# Patient Record
Sex: FEMALE | Race: WHITE | Employment: OTHER | URBAN - METROPOLITAN AREA
[De-identification: names, ages, dates, MRNs, and addresses within clinical notes are randomized per-mention and may not be internally consistent; named-entity substitution may affect disease eponyms.]

---

## 2017-01-03 DIAGNOSIS — I10 HYPERTENSION, ESSENTIAL, BENIGN: Primary | ICD-10-CM

## 2017-01-03 RX ORDER — VERAPAMIL HYDROCHLORIDE 120 MG/1
TABLET, FILM COATED ORAL
Qty: 270 TABLET | Refills: 0 | Status: SHIPPED | OUTPATIENT
Start: 2017-01-03 | End: 2017-07-04

## 2017-01-05 DIAGNOSIS — I10 HYPERTENSION, ESSENTIAL, BENIGN: Primary | ICD-10-CM

## 2017-01-05 DIAGNOSIS — E89.0 POSTSURGICAL HYPOTHYROIDISM: ICD-10-CM

## 2017-01-05 RX ORDER — IRBESARTAN 300 MG/1
TABLET ORAL
Qty: 90 TABLET | Refills: 1 | OUTPATIENT
Start: 2017-01-05

## 2017-01-05 RX ORDER — LEVOTHYROXINE SODIUM 137 UG/1
TABLET ORAL
Qty: 90 TABLET | Refills: 0 | Status: SHIPPED | OUTPATIENT
Start: 2017-01-05 | End: 2017-02-27 | Stop reason: DRUGHIGH

## 2017-01-05 NOTE — TELEPHONE ENCOUNTER
Patient called and requested Levothyroxine and Irbesartan 90 day day to be sent to [facility] on file.

## 2017-02-21 ENCOUNTER — TELEPHONE (OUTPATIENT)
Dept: INTERNAL MEDICINE CLINIC | Facility: CLINIC | Age: 77
End: 2017-02-21

## 2017-02-21 NOTE — TELEPHONE ENCOUNTER
Spoke with patient and she c/o bilateral leg pain on and off x1 month that has gotten worse this week. She states she is taking something for pain everyday, otherwise she can not sleep due to pain. She denies pain at calf area, no SOB or chest pain.  She de

## 2017-02-22 ENCOUNTER — OFFICE VISIT (OUTPATIENT)
Dept: INTERNAL MEDICINE CLINIC | Facility: CLINIC | Age: 77
End: 2017-02-22

## 2017-02-22 VITALS
OXYGEN SATURATION: 97 % | TEMPERATURE: 98 F | WEIGHT: 135 LBS | BODY MASS INDEX: 26.5 KG/M2 | DIASTOLIC BLOOD PRESSURE: 76 MMHG | RESPIRATION RATE: 14 BRPM | SYSTOLIC BLOOD PRESSURE: 138 MMHG | HEIGHT: 60 IN | HEART RATE: 82 BPM

## 2017-02-22 DIAGNOSIS — M48.061 LUMBAR STENOSIS: Primary | ICD-10-CM

## 2017-02-22 DIAGNOSIS — M79.605 BILATERAL LEG PAIN: ICD-10-CM

## 2017-02-22 DIAGNOSIS — M79.604 BILATERAL LEG PAIN: ICD-10-CM

## 2017-02-22 PROCEDURE — 99214 OFFICE O/P EST MOD 30 MIN: CPT | Performed by: PHYSICIAN ASSISTANT

## 2017-02-22 RX ORDER — BUPROPION HYDROCHLORIDE 150 MG/1
TABLET ORAL
Refills: 1 | COMMUNITY
Start: 2017-01-03 | End: 2017-06-22 | Stop reason: DRUGHIGH

## 2017-02-22 RX ORDER — PANTOPRAZOLE SODIUM 20 MG/1
TABLET, DELAYED RELEASE ORAL
Refills: 0 | COMMUNITY
Start: 2017-01-03 | End: 2017-02-24 | Stop reason: DRUGHIGH

## 2017-02-22 NOTE — PROGRESS NOTES
HPI:    Patient ID: Tito Russell is a 67year old female. Leg Pain   The pain is present in the left hip, left upper leg, right upper leg and right hip (sometimes radiates down to feet). This is a new problem.  The current episode started 1 to 4 we DAILY Disp: 90 tablet Rfl: 0   Insulin Pen Needle (BD PEN NEEDLE MINI U/F) 31G X 5 MM Does not apply Misc Use 4 times daily Disp: 400 each Rfl: 3   SF 1.1 % Dental Gel  Disp:  Rfl: 5   RaNITidine HCl 300 MG Oral Tab Take 300 mg by mouth nightly.  Disp:  Rfl [DISCONTINUED] IRBESARTAN 300 MG Oral Tab TAKE 1 TABLET (300 MG) BY MOUTH ONCE DAILY Disp: 90 tablet Rfl: 0     Allergies:  Abilify                 Unknown    Comment:tremor  Bextra [Valdecoxib]     Nausea only    Comment:\"TABS\"  Clindamycin 26.37 kg/m2  SpO2 97%          ASSESSMENT/PLAN:   Lumbar stenosis  (primary encounter diagnosis)  Bilateral leg pain    Leg pain appears radicular, recommend physical therapy. Pt expresses understanding and agrees to the plan.  Return for appointment as s

## 2017-02-24 ENCOUNTER — OFFICE VISIT (OUTPATIENT)
Dept: INTERNAL MEDICINE CLINIC | Facility: CLINIC | Age: 77
End: 2017-02-24

## 2017-02-24 VITALS
SYSTOLIC BLOOD PRESSURE: 110 MMHG | HEIGHT: 60.25 IN | TEMPERATURE: 98 F | BODY MASS INDEX: 25.96 KG/M2 | RESPIRATION RATE: 16 BRPM | HEART RATE: 87 BPM | OXYGEN SATURATION: 97 % | WEIGHT: 134 LBS | DIASTOLIC BLOOD PRESSURE: 70 MMHG

## 2017-02-24 DIAGNOSIS — Z12.31 VISIT FOR SCREENING MAMMOGRAM: ICD-10-CM

## 2017-02-24 DIAGNOSIS — Z00.00 ENCOUNTER FOR ANNUAL HEALTH EXAMINATION: Primary | ICD-10-CM

## 2017-02-24 DIAGNOSIS — R20.2 PARESTHESIA: ICD-10-CM

## 2017-02-24 PROCEDURE — G0439 PPPS, SUBSEQ VISIT: HCPCS | Performed by: PHYSICIAN ASSISTANT

## 2017-02-24 NOTE — PROGRESS NOTES
HPI:   Rigo Pablo is a 67year old female who presents for a Medicare Subsequent Annual Wellness visit (Pt already had Initial Annual Wellness). Patient doing well.  Has been losing weight with Weight Wat[employer] and with switching to [place] has had TSH 0.125* 02/25/2017   CREATSERUM 0.72 02/25/2017   * 02/25/2017        CBC  (most recent labs)     Lab Results  Component Value Date   WBC 5.2 02/25/2017   HGB 13.3 02/25/2017   .0 02/25/2017        ALLERGIES:   She is allergic to abilify Cramping or Diarrhea. Beclomethasone Dipropionate (QNASL) 80 MCG/ACT Nasal Aero Soln 2 sprays by Nasal route daily.  (Patient taking differently: 2 sprays by Nasal route daily as needed.)   Insulin Pen Needle (BD PEN NEEDLE ALAN U/F) 32G X 4 MM Does not a and cataract. Her family history includes Cancer in her son; Diabetes in her brother, father, paternal grandfather, and sister; Heart Disorder in her mother; Hypertension in her son; Thyroid Disorder in an other family member.    SOCIAL HISTORY:   She  r external ear canals, both ears   Nose: Nares normal, septum midline,mucosa normal, no drainage or sinus tenderness   Throat: Lips, mucosa, and tongue normal; teeth and gums normal   Neck: Supple, symmetrical, trachea midline, no adenopathy;  thyroid: not e Advanced Directive:  Living Will on file in [address]? Rusty Benoit does not have a Living Will on file in [address]  Discussed with patient and provided information      Healthcare Power of Jose on file in [facility]:    Rusty Benoit does not have a Varsha 1-Yes    Do you have difficulty seeing?: 0-No    Do you have any difficulty walking or getting up?: 0-No    Do you have any tripping hazards?: 0-No    Are you on multiple medications?: 1-Yes    Does pain affect your day to day activities?: 1-Yes     Have y Colonoscopy Screen every 10 years Colonoscopy,10 Years due on 06/03/2023 Update Health Maintenance if applicable    Flex Sigmoidoscopy Screen every 10 years No results found for this or any previous visit. No flowsheet data found.      Fecal Occult Blood An Procedure   Annual Monitoring of Persistent     Medications (ACE/ARB, digoxin diuretics, anticonvulsants.)    Potassium  Annually POTASSIUM (mmol/L)   Date Value   02/25/2017 3.9   05/02/2014 4.0   09/26/2012 4.4   11/08/2010 4.3    No flowsheet data found

## 2017-02-24 NOTE — PATIENT INSTRUCTIONS
Check on due date for screening mammogram with [facility]  Have labs drawn, fasting, for both sets of orders provided (CBC, CMP, TSH, LIPID PANEL, VITAMIN D, MICROALBUMIN, UA as well as B12, FOLATE)    Callie Marcus's SCREENING SCHEDULE   Tests on this list a screening covered service except at the Welcome to Medicare Visit    Abdominal aortic aneurysm screening (once between ages 73-68) IPPE only No results found for this or any previous visit.  Limited to patients who meet one of the following criteria:   • Me results found for: CHLAMYDIA No flowsheet data found.     Screening Mammogram      Mammogram    Recommend Annually to at least age 76, and as needed after 76 Mammogram,1 Yr due on 10/23/2016 Please get this Mammogram regularly   Immunizations      Influenza necessary form from the [facility]. http://www. idph.[facility]. [place]/public/books/advin.htm  A link to the [employer].  This site has a lot of good information including definitions of the different types of Ad

## 2017-02-25 ENCOUNTER — LAB ENCOUNTER (OUTPATIENT)
Dept: LAB | Age: 77
End: 2017-02-25
Attending: PHYSICIAN ASSISTANT
Payer: MEDICARE

## 2017-02-25 DIAGNOSIS — E11.40 TYPE 2 DIABETES, CONTROLLED, WITH NEUROPATHY (HCC): ICD-10-CM

## 2017-02-25 DIAGNOSIS — E89.0 POSTSURGICAL HYPOTHYROIDISM: ICD-10-CM

## 2017-02-25 DIAGNOSIS — M85.80 OSTEOPENIA: ICD-10-CM

## 2017-02-25 DIAGNOSIS — R20.2 PARESTHESIA: ICD-10-CM

## 2017-02-25 LAB
25-HYDROXYVITAMIN D (TOTAL): 67.1 NG/ML (ref 30–100)
ALBUMIN SERPL-MCNC: 3.6 G/DL (ref 3.5–4.8)
ALP LIVER SERPL-CCNC: 86 U/L (ref 55–142)
ALT SERPL-CCNC: 25 U/L (ref 14–54)
AST SERPL-CCNC: 24 U/L (ref 15–41)
BASOPHILS # BLD AUTO: 0.04 X10(3) UL (ref 0–0.1)
BASOPHILS NFR BLD AUTO: 0.8 %
BILIRUB SERPL-MCNC: 0.6 MG/DL (ref 0.1–2)
BILIRUB UR QL STRIP.AUTO: NEGATIVE
BUN BLD-MCNC: 17 MG/DL (ref 8–20)
CALCIUM BLD-MCNC: 8.5 MG/DL (ref 8.3–10.3)
CHLORIDE: 107 MMOL/L (ref 101–111)
CHOLEST SMN-MCNC: 120 MG/DL (ref ?–200)
CLARITY UR REFRACT.AUTO: CLEAR
CO2: 29 MMOL/L (ref 22–32)
COLOR UR AUTO: YELLOW
CREAT BLD-MCNC: 0.72 MG/DL (ref 0.55–1.02)
CREAT UR-SCNC: 129 MG/DL
EOSINOPHIL # BLD AUTO: 0.14 X10(3) UL (ref 0–0.3)
EOSINOPHIL NFR BLD AUTO: 2.7 %
ERYTHROCYTE [DISTWIDTH] IN BLOOD BY AUTOMATED COUNT: 12.7 % (ref 11.5–16)
EST. AVERAGE GLUCOSE BLD GHB EST-MCNC: 137 MG/DL (ref 68–126)
FOLATE (FOLIC ACID), SERUM: 13.3 NG/ML (ref 8.7–24)
FREE T4: 1.1 NG/DL (ref 0.9–1.8)
GLUCOSE BLD-MCNC: 130 MG/DL (ref 70–99)
GLUCOSE UR STRIP.AUTO-MCNC: NEGATIVE MG/DL
HAV AB SERPL IA-ACNC: 306 PG/ML (ref 193–986)
HBA1C MFR BLD HPLC: 6.4 % (ref ?–5.7)
HCT VFR BLD AUTO: 40.1 % (ref 34–50)
HDLC SERPL-MCNC: 54 MG/DL (ref 45–?)
HDLC SERPL: 2.22 {RATIO} (ref ?–4.44)
HGB BLD-MCNC: 13.3 G/DL (ref 12–16)
IMMATURE GRANULOCYTE COUNT: 0.01 X10(3) UL (ref 0–1)
IMMATURE GRANULOCYTE RATIO %: 0.2 %
KETONES UR STRIP.AUTO-MCNC: NEGATIVE MG/DL
LDLC SERPL CALC-MCNC: 50 MG/DL (ref ?–130)
LYMPHOCYTES # BLD AUTO: 1.29 X10(3) UL (ref 0.9–4)
LYMPHOCYTES NFR BLD AUTO: 24.8 %
M PROTEIN MFR SERPL ELPH: 6.4 G/DL (ref 6.1–8.3)
MCH RBC QN AUTO: 31.1 PG (ref 27–33.2)
MCHC RBC AUTO-ENTMCNC: 33.2 G/DL (ref 31–37)
MCV RBC AUTO: 93.9 FL (ref 81–100)
MICROALBUMIN UR-MCNC: 1.18 MG/DL
MICROALBUMIN/CREAT 24H UR-RTO: 9.1 UG/MG (ref ?–30)
MONOCYTES # BLD AUTO: 0.39 X10(3) UL (ref 0.1–0.6)
MONOCYTES NFR BLD AUTO: 7.5 %
NEUTROPHIL ABS PRELIM: 3.33 X10 (3) UL (ref 1.3–6.7)
NEUTROPHILS # BLD AUTO: 3.33 X10(3) UL (ref 1.3–6.7)
NEUTROPHILS NFR BLD AUTO: 64 %
NITRITE UR QL STRIP.AUTO: NEGATIVE
NONHDLC SERPL-MCNC: 66 MG/DL (ref ?–130)
PH UR STRIP.AUTO: 5 [PH] (ref 4.5–8)
PLATELET # BLD AUTO: 200 10(3)UL (ref 150–450)
POTASSIUM SERPL-SCNC: 3.9 MMOL/L (ref 3.6–5.1)
PROT UR STRIP.AUTO-MCNC: NEGATIVE MG/DL
RBC # BLD AUTO: 4.27 X10(6)UL (ref 3.8–5.1)
RBC UR QL AUTO: NEGATIVE
RED CELL DISTRIBUTION WIDTH-SD: 43.4 FL (ref 35.1–46.3)
SODIUM SERPL-SCNC: 142 MMOL/L (ref 136–144)
SP GR UR STRIP.AUTO: 1.02 (ref 1–1.03)
TRIGLYCERIDES: 78 MG/DL (ref ?–150)
TSI SER-ACNC: 0.12 MIU/ML (ref 0.35–5.5)
UROBILINOGEN UR STRIP.AUTO-MCNC: <2 MG/DL
VLDL: 16 MG/DL (ref 5–40)
WBC # BLD AUTO: 5.2 X10(3) UL (ref 4–13)

## 2017-02-25 PROCEDURE — 82570 ASSAY OF URINE CREATININE: CPT

## 2017-02-25 PROCEDURE — 82306 VITAMIN D 25 HYDROXY: CPT

## 2017-02-25 PROCEDURE — 84443 ASSAY THYROID STIM HORMONE: CPT

## 2017-02-25 PROCEDURE — 80061 LIPID PANEL: CPT

## 2017-02-25 PROCEDURE — 84439 ASSAY OF FREE THYROXINE: CPT

## 2017-02-25 PROCEDURE — 82746 ASSAY OF FOLIC ACID SERUM: CPT

## 2017-02-25 PROCEDURE — 80053 COMPREHEN METABOLIC PANEL: CPT

## 2017-02-25 PROCEDURE — 87088 URINE BACTERIA CULTURE: CPT

## 2017-02-25 PROCEDURE — 82043 UR ALBUMIN QUANTITATIVE: CPT

## 2017-02-25 PROCEDURE — 85025 COMPLETE CBC W/AUTO DIFF WBC: CPT

## 2017-02-25 PROCEDURE — 36415 COLL VENOUS BLD VENIPUNCTURE: CPT

## 2017-02-25 PROCEDURE — 87186 SC STD MICRODIL/AGAR DIL: CPT

## 2017-02-25 PROCEDURE — 81001 URINALYSIS AUTO W/SCOPE: CPT

## 2017-02-25 PROCEDURE — 83036 HEMOGLOBIN GLYCOSYLATED A1C: CPT

## 2017-02-25 PROCEDURE — 87086 URINE CULTURE/COLONY COUNT: CPT

## 2017-02-25 PROCEDURE — 82607 VITAMIN B-12: CPT

## 2017-02-27 ENCOUNTER — TELEPHONE (OUTPATIENT)
Dept: INTERNAL MEDICINE CLINIC | Facility: CLINIC | Age: 77
End: 2017-02-27

## 2017-02-27 ENCOUNTER — MED REC SCAN ONLY (OUTPATIENT)
Dept: INTERNAL MEDICINE CLINIC | Facility: CLINIC | Age: 77
End: 2017-02-27

## 2017-02-27 DIAGNOSIS — E89.0 POSTSURGICAL HYPOTHYROIDISM: Primary | ICD-10-CM

## 2017-02-27 RX ORDER — LEVOTHYROXINE SODIUM 0.12 MG/1
125 TABLET ORAL
Qty: 30 TABLET | Refills: 1 | Status: SHIPPED | OUTPATIENT
Start: 2017-02-27 | End: 2017-04-03

## 2017-02-27 NOTE — TELEPHONE ENCOUNTER
----- Message from Karla Mendoza PA-C sent at 2/26/2017  4:41 PM CST -----  Please inform pt: levothyroxine dose too high, decrease to 125 mcg and recheck in 6 wks.   Otherwise cholesterol and vitamin D at goal, B12 and folate wnl, no concerning findin

## 2017-02-27 NOTE — TELEPHONE ENCOUNTER
----- Message from David Su MD sent at 2/27/2017 12:20 AM CST -----  HbA1C has improved from 6.7 in 12/2016. Please, inform the pt.

## 2017-02-27 NOTE — TELEPHONE ENCOUNTER
----- Message from Sotero Masters PA-C sent at 2/27/2017 12:38 PM CST -----  Please inform pt: urine cx + for e.coli, does pt have UTI symptoms?

## 2017-03-02 NOTE — PROGRESS NOTES
Quick Note:    Appreciate that results were forwarded to me.     We will discuss them in detail at her upcoming appt in April 4/7/2017 11:00 AM MD Shirin Haney        ______

## 2017-03-06 ENCOUNTER — OFFICE VISIT (OUTPATIENT)
Dept: RHEUMATOLOGY | Facility: CLINIC | Age: 77
End: 2017-03-06

## 2017-03-06 VITALS
RESPIRATION RATE: 12 BRPM | HEART RATE: 68 BPM | WEIGHT: 134 LBS | DIASTOLIC BLOOD PRESSURE: 86 MMHG | BODY MASS INDEX: 26 KG/M2 | SYSTOLIC BLOOD PRESSURE: 128 MMHG

## 2017-03-06 DIAGNOSIS — M79.7 FIBROMYALGIA: Primary | Chronic | ICD-10-CM

## 2017-03-06 DIAGNOSIS — M47.816 SPONDYLOSIS OF LUMBAR REGION WITHOUT MYELOPATHY OR RADICULOPATHY: ICD-10-CM

## 2017-03-06 PROCEDURE — 99213 OFFICE O/P EST LOW 20 MIN: CPT | Performed by: INTERNAL MEDICINE

## 2017-03-06 RX ORDER — HYDROCODONE BITARTRATE AND ACETAMINOPHEN 10; 325 MG/1; MG/1
1 TABLET ORAL EVERY 6 HOURS PRN
Qty: 120 TABLET | Refills: 0 | Status: SHIPPED | OUTPATIENT
Start: 2017-03-06 | End: 2017-03-06

## 2017-03-06 RX ORDER — HYDROCODONE BITARTRATE AND ACETAMINOPHEN 10; 325 MG/1; MG/1
1 TABLET ORAL EVERY 6 HOURS PRN
Qty: 120 TABLET | Refills: 0 | Status: SHIPPED | OUTPATIENT
Start: 2017-05-06 | End: 2017-03-30

## 2017-03-06 NOTE — PATIENT INSTRUCTIONS
At this time the plan is to use the Norco 10 mg 1 tablet every 6 hours for pain generally 1 or 2 a day if needed. It is okay to take 2 a day it will not become a habit at that point. It would be better to take 5 or 6 a day.   Main side effect of Norco is

## 2017-03-06 NOTE — PROGRESS NOTES
[facility]  Dr. Erica Redd Progress Note     Subjective:   Viv Warner is a(n) 67year old female. Current complaints: Patient presents with:  Fibromyalgia Syndrome: Pt states 'is feeling good with the exception of legs- achy.  Has been doing PT bu

## 2017-03-30 ENCOUNTER — TELEPHONE (OUTPATIENT)
Dept: RHEUMATOLOGY | Facility: CLINIC | Age: 77
End: 2017-03-30

## 2017-03-30 RX ORDER — HYDROCODONE BITARTRATE AND ACETAMINOPHEN 10; 325 MG/1; MG/1
1 TABLET ORAL EVERY 6 HOURS PRN
Qty: 120 TABLET | Refills: 0 | Status: SHIPPED | OUTPATIENT
Start: 2017-04-10 | End: 2017-04-03

## 2017-03-30 NOTE — TELEPHONE ENCOUNTER
Pt called because she want's her norco script to be dated to 4-10-17 instead of 4-17-17. Pt is going out of the country on 4-17-17 and will return on 5-28-17. Pt's last refill on 3-6-17. Please advised.

## 2017-04-03 DIAGNOSIS — E89.0 POSTSURGICAL HYPOTHYROIDISM: Primary | ICD-10-CM

## 2017-04-03 RX ORDER — HYDROCODONE BITARTRATE AND ACETAMINOPHEN 10; 325 MG/1; MG/1
1 TABLET ORAL EVERY 6 HOURS PRN
Qty: 120 TABLET | Refills: 0 | Status: SHIPPED | OUTPATIENT
Start: 2017-04-10 | End: 2017-08-28

## 2017-04-03 RX ORDER — LEVOTHYROXINE SODIUM 0.12 MG/1
125 TABLET ORAL
Qty: 90 TABLET | Refills: 0 | Status: SHIPPED | OUTPATIENT
Start: 2017-04-03 | End: 2017-04-10 | Stop reason: DRUGHIGH

## 2017-04-03 NOTE — TELEPHONE ENCOUNTER
Pt requesting 90 day supply of Synthroid, sent to [address] [place] [address] on file. Pt will be going out of town for 6 wks.

## 2017-04-10 ENCOUNTER — APPOINTMENT (OUTPATIENT)
Dept: LAB | Age: 77
End: 2017-04-10
Attending: PHYSICIAN ASSISTANT
Payer: MEDICARE

## 2017-04-10 ENCOUNTER — TELEPHONE (OUTPATIENT)
Dept: INTERNAL MEDICINE CLINIC | Facility: CLINIC | Age: 77
End: 2017-04-10

## 2017-04-10 DIAGNOSIS — E89.0 POSTSURGICAL HYPOTHYROIDISM: ICD-10-CM

## 2017-04-10 DIAGNOSIS — E89.0 POSTSURGICAL HYPOTHYROIDISM: Primary | ICD-10-CM

## 2017-04-10 PROCEDURE — 36415 COLL VENOUS BLD VENIPUNCTURE: CPT

## 2017-04-10 PROCEDURE — 84443 ASSAY THYROID STIM HORMONE: CPT

## 2017-04-10 RX ORDER — LEVOTHYROXINE SODIUM 112 UG/1
112 TABLET ORAL
Qty: 60 TABLET | Refills: 0 | Status: SHIPPED | OUTPATIENT
Start: 2017-04-10 | End: 2017-06-21 | Stop reason: DRUGHIGH

## 2017-04-10 RX ORDER — ATORVASTATIN CALCIUM 40 MG/1
TABLET, FILM COATED ORAL
Qty: 90 TABLET | Refills: 0 | Status: SHIPPED | OUTPATIENT
Start: 2017-04-10 | End: 2017-06-06

## 2017-04-10 RX ORDER — METOPROLOL SUCCINATE 25 MG/1
TABLET, EXTENDED RELEASE ORAL
Qty: 90 TABLET | Refills: 0 | Status: SHIPPED | OUTPATIENT
Start: 2017-04-10 | End: 2017-06-06

## 2017-04-10 NOTE — TELEPHONE ENCOUNTER
D/w pt results and recommendations. Pt states she will be on a trip for 6 weeks and would like #60 of new dose to hold her over until her return. Pt will then recheck her TSH when she returns. Rx and order placed.

## 2017-04-10 NOTE — TELEPHONE ENCOUNTER
----- Message from Judge Kaylyn PA-C sent at 4/10/2017  1:00 PM CDT -----  Please inform pt: TSH improved but still out of range.  Decrease levothyroxine to 112 mcg daily, recheck 6 wks

## 2017-04-12 ENCOUNTER — TELEPHONE (OUTPATIENT)
Dept: INTERNAL MEDICINE CLINIC | Facility: CLINIC | Age: 77
End: 2017-04-12

## 2017-04-12 NOTE — TELEPHONE ENCOUNTER
Please call patient  Patient is wondering if her atorvastatin had been increased to 40. What She had been taking is only 20.

## 2017-04-12 NOTE — TELEPHONE ENCOUNTER
Spoke with pt she has been having some neck pain and she spoke with her previous physical therapist regarding this. When she was receiving PT a special neck pad was used and this helped greatly. The name of the pad was occipivot.   The PT recommended pt c

## 2017-04-12 NOTE — TELEPHONE ENCOUNTER
Spoke with pt she no longer has a question she had both her medication and her spouse's medication and was confused but now she realized.

## 2017-04-12 NOTE — TELEPHONE ENCOUNTER
Pt needs to speak w/ a nurse about a neck pad that she is recommended to use, but she doesn't know if her insurance will cover it?

## 2017-06-02 ENCOUNTER — MED REC SCAN ONLY (OUTPATIENT)
Dept: INTERNAL MEDICINE CLINIC | Facility: CLINIC | Age: 77
End: 2017-06-02

## 2017-06-02 ENCOUNTER — TELEPHONE (OUTPATIENT)
Dept: INTERNAL MEDICINE CLINIC | Facility: CLINIC | Age: 77
End: 2017-06-02

## 2017-06-02 DIAGNOSIS — R42 VERTIGO: Primary | ICD-10-CM

## 2017-06-02 DIAGNOSIS — R26.81 UNSTEADY GAIT: ICD-10-CM

## 2017-06-02 NOTE — TELEPHONE ENCOUNTER
Pt has had vertigo for the past 3 weeks while on vacation, also having some balance issues, would like to have some physical therapy done, call back

## 2017-06-02 NOTE — TELEPHONE ENCOUNTER
Per Dr. Regalado Saliva refer to [address].      Order faxed to preferred [facility].    Pt aware.

## 2017-06-06 DIAGNOSIS — E78.5 HYPERLIPIDEMIA LDL GOAL <100: Primary | ICD-10-CM

## 2017-06-06 DIAGNOSIS — I10 HYPERTENSION, ESSENTIAL, BENIGN: ICD-10-CM

## 2017-06-06 RX ORDER — ATORVASTATIN CALCIUM 40 MG/1
TABLET, FILM COATED ORAL
Qty: 90 TABLET | Refills: 1 | Status: SHIPPED | OUTPATIENT
Start: 2017-06-06 | End: 2018-01-05

## 2017-06-06 RX ORDER — METOPROLOL SUCCINATE 25 MG/1
TABLET, EXTENDED RELEASE ORAL
Qty: 90 TABLET | Refills: 1 | Status: SHIPPED | OUTPATIENT
Start: 2017-06-06 | End: 2018-01-05

## 2017-06-06 RX ORDER — IRBESARTAN 300 MG/1
TABLET ORAL
Qty: 90 TABLET | Refills: 1 | Status: SHIPPED | OUTPATIENT
Start: 2017-06-06 | End: 2018-01-05

## 2017-06-20 ENCOUNTER — APPOINTMENT (OUTPATIENT)
Dept: LAB | Age: 77
End: 2017-06-20
Attending: PHYSICIAN ASSISTANT
Payer: MEDICARE

## 2017-06-20 DIAGNOSIS — E89.0 POSTSURGICAL HYPOTHYROIDISM: ICD-10-CM

## 2017-06-20 PROCEDURE — 84443 ASSAY THYROID STIM HORMONE: CPT

## 2017-06-20 PROCEDURE — 36415 COLL VENOUS BLD VENIPUNCTURE: CPT

## 2017-06-21 ENCOUNTER — TELEPHONE (OUTPATIENT)
Dept: INTERNAL MEDICINE CLINIC | Facility: CLINIC | Age: 77
End: 2017-06-21

## 2017-06-21 DIAGNOSIS — E89.0 POSTSURGICAL HYPOTHYROIDISM: Primary | ICD-10-CM

## 2017-06-21 RX ORDER — LEVOTHYROXINE SODIUM 0.1 MG/1
100 TABLET ORAL DAILY
Qty: 30 TABLET | Refills: 1 | Status: SHIPPED | OUTPATIENT
Start: 2017-06-21 | End: 2017-06-22 | Stop reason: ALTCHOICE

## 2017-06-21 NOTE — TELEPHONE ENCOUNTER
----- Message from Antwan Soni PA-C sent at 6/20/2017 10:10 PM CDT -----  Please inform pt: synthroid dose still too high.  Decrease synthroid to 100 mcg recheck TSH in 6 wks

## 2017-07-04 DIAGNOSIS — I10 HYPERTENSION, ESSENTIAL, BENIGN: ICD-10-CM

## 2017-07-05 RX ORDER — VERAPAMIL HYDROCHLORIDE 120 MG/1
TABLET, FILM COATED ORAL
Qty: 270 TABLET | Refills: 1 | Status: SHIPPED | OUTPATIENT
Start: 2017-07-05 | End: 2017-12-27

## 2017-08-03 ENCOUNTER — TELEPHONE (OUTPATIENT)
Dept: INTERNAL MEDICINE CLINIC | Facility: CLINIC | Age: 77
End: 2017-08-03

## 2017-08-03 NOTE — TELEPHONE ENCOUNTER
Spoke with patient and she states she is unhappy with her psychiatrist. She has a difficult time getting a hold of her and she is often given the wrong prescriptions. Patient states she has lost \" all trust\" with her care.      Per Dr. Dede Campo, he is okay wi

## 2017-08-03 NOTE — TELEPHONE ENCOUNTER
Pt had been getting prescriptions for bupropion 300mg and prozac 20mg from her psychiatrist, but she says she is not able to go to that psych anymore - wants to know if Dr Sabine Yung could prescribe these meds for her? Please c/b at [phone].

## 2017-08-08 ENCOUNTER — TELEPHONE (OUTPATIENT)
Dept: INTERNAL MEDICINE CLINIC | Facility: CLINIC | Age: 77
End: 2017-08-08

## 2017-08-08 ENCOUNTER — OFFICE VISIT (OUTPATIENT)
Dept: INTERNAL MEDICINE CLINIC | Facility: CLINIC | Age: 77
End: 2017-08-08

## 2017-08-08 ENCOUNTER — APPOINTMENT (OUTPATIENT)
Dept: LAB | Age: 77
End: 2017-08-08
Attending: PHYSICIAN ASSISTANT
Payer: MEDICARE

## 2017-08-08 VITALS
OXYGEN SATURATION: 93 % | DIASTOLIC BLOOD PRESSURE: 76 MMHG | HEIGHT: 60.25 IN | BODY MASS INDEX: 26.74 KG/M2 | WEIGHT: 138 LBS | HEART RATE: 75 BPM | RESPIRATION RATE: 16 BRPM | SYSTOLIC BLOOD PRESSURE: 122 MMHG | TEMPERATURE: 98 F

## 2017-08-08 DIAGNOSIS — E78.2 MIXED HYPERLIPIDEMIA: ICD-10-CM

## 2017-08-08 DIAGNOSIS — E11.40 TYPE 2 DIABETES, CONTROLLED, WITH NEUROPATHY (HCC): Primary | ICD-10-CM

## 2017-08-08 DIAGNOSIS — E89.0 POSTSURGICAL HYPOTHYROIDISM: ICD-10-CM

## 2017-08-08 DIAGNOSIS — I10 HYPERTENSION, ESSENTIAL, BENIGN: ICD-10-CM

## 2017-08-08 DIAGNOSIS — E89.0 POSTSURGICAL HYPOTHYROIDISM: Primary | ICD-10-CM

## 2017-08-08 DIAGNOSIS — F34.1 DYSTHYMIA: ICD-10-CM

## 2017-08-08 LAB — TSI SER-ACNC: 11.6 MIU/ML (ref 0.35–5.5)

## 2017-08-08 PROCEDURE — 36415 COLL VENOUS BLD VENIPUNCTURE: CPT

## 2017-08-08 PROCEDURE — 84443 ASSAY THYROID STIM HORMONE: CPT

## 2017-08-08 PROCEDURE — 99214 OFFICE O/P EST MOD 30 MIN: CPT | Performed by: INTERNAL MEDICINE

## 2017-08-08 RX ORDER — LEVOTHYROXINE SODIUM 0.1 MG/1
100 TABLET ORAL DAILY
COMMUNITY
Start: 2017-08-08 | End: 2017-08-11 | Stop reason: DRUGHIGH

## 2017-08-08 RX ORDER — LEVOTHYROXINE SODIUM 112 UG/1
TABLET ORAL
Qty: 30 TABLET | Refills: 1 | Status: CANCELLED | OUTPATIENT
Start: 2017-08-08

## 2017-08-08 RX ORDER — LEVOTHYROXINE SODIUM 0.1 MG/1
TABLET ORAL
Qty: 30 TABLET | Refills: 1 | Status: CANCELLED | OUTPATIENT
Start: 2017-08-08

## 2017-08-08 RX ORDER — BUPROPION HYDROCHLORIDE 300 MG/1
300 TABLET ORAL DAILY
Qty: 90 TABLET | Refills: 1 | Status: SHIPPED | OUTPATIENT
Start: 2017-08-08 | End: 2018-01-05

## 2017-08-08 RX ORDER — AMOXICILLIN 500 MG/1
CAPSULE ORAL
COMMUNITY
Start: 2017-08-07 | End: 2017-09-21 | Stop reason: ALTCHOICE

## 2017-08-08 RX ORDER — FLUOXETINE HYDROCHLORIDE 20 MG/1
20 CAPSULE ORAL DAILY
Qty: 90 CAPSULE | Refills: 1 | Status: SHIPPED | OUTPATIENT
Start: 2017-08-08 | End: 2018-02-19

## 2017-08-08 NOTE — PROGRESS NOTES
HPI:    Patient ID: Sobeida Beauchamp is a 67year old female. HPI  HPI:   Sobeida Beauchamp is a 67year old female who presents for recheck of her diabetes, htn, HLD and depression. Patient’s FBS have been fluctuating for last few weeks.  Has appt with minesh Disp: 90 tablet Rfl: 1   VERAPAMIL  MG Oral Tab TAKE 1 TABLET BY MOUTH 3 (THREE) TIMES DAILY Disp: 270 tablet Rfl: 1   BYETTA 10 MCG PEN 10 MCG/0.04ML Subcutaneous Solution Pen-injector TAKE 10 MCG BY SUBCUTANEOUS ROUTE BEFORE BREAKFAST AND DINNER D Génesis BRISCOE Does not apply Misc For insulin use 3 times per day Disp: 300 each Rfl: 1   MELATONIN 1 tablet nightly.  Disp:  Rfl:    VAGIFEM 10 MCG Vaginal Tab INSERT 1 TABLET (10 MCG) BY VAGINAL ROUTE TWICE WEEKLY (Patient taking differently: MANAGEMENT  8/17/2012: FLUOR GID & Delonte Pier NDL/CATH SPI DX/THER [facility]      Comment: Procedure: CERVICAL EPIDURAL;  Surgeon:                Heidi Saenz MD;  Location: [place] [address] MANAGEMENT  7/26/2012: INJECTION, W/WO CONTRAST, DX/TH auscultation  CARDIO: RRR without murmur  GI: good BS's,no masses, HSM or tenderness  EXTREMITIES: no cyanosis, clubbing or edema  NEURO: Bilateral barefoot skin diabetic exam is normal, visualized feet and the appearance is normal.  Bilateral monofilament Dx: Type 2 DM (E11.65) on insulin Disp: 300 strip Rfl: 1   Ipratropium Bromide 0.03 % Nasal Solution 2 sprays by Nasal route 3 (three) times daily.  As needed Disp: 1 Bottle Rfl: 11   HYDROcodone-acetaminophen  MG Oral Tab Take 1 tablet by mouth every Iodine              Swelling    Comment:SOB             REQUIRES PRETREATMENT FOR ANY CT/XRAY STUDIES             OK W/ BETADINE  Mri [Gadolinium Rj*    Tightness in Throat    Comment:PT STATES SHE THINKS SHE HAD REACTION TO CONTRAST             FOR AN MR

## 2017-08-08 NOTE — TELEPHONE ENCOUNTER
----- Message from Oliver Landry PA-C sent at 8/8/2017  1:02 PM CDT -----  Please inform pt: now synthroid dose too low. Continue 100 mcg M/W/F and start 112 mcg TU/TH/SA/SU. Recheck 6 wks.  Will consult with her endo if still not at goal.

## 2017-08-08 NOTE — TELEPHONE ENCOUNTER
Spoke with pt she sees her endocrinologist on Friday and will discuss further Levothyroxine dosage titration with her. She will continue taking current dose of 100 mcg daily.

## 2017-08-14 ENCOUNTER — LAB ENCOUNTER (OUTPATIENT)
Dept: LAB | Age: 77
End: 2017-08-14
Attending: INTERNAL MEDICINE
Payer: MEDICARE

## 2017-08-14 DIAGNOSIS — E11.40 TYPE 2 DIABETES, CONTROLLED, WITH NEUROPATHY (HCC): ICD-10-CM

## 2017-08-14 LAB — GLUCOSE BLD-MCNC: 233 MG/DL (ref 70–99)

## 2017-08-14 PROCEDURE — 84681 ASSAY OF C-PEPTIDE: CPT

## 2017-08-14 PROCEDURE — 36415 COLL VENOUS BLD VENIPUNCTURE: CPT

## 2017-08-14 PROCEDURE — 82947 ASSAY GLUCOSE BLOOD QUANT: CPT

## 2017-08-15 LAB — C-PEPTIDE, SERUM OR PLASMA: 1.1 NG/ML

## 2017-08-28 ENCOUNTER — TELEPHONE (OUTPATIENT)
Dept: RHEUMATOLOGY | Facility: CLINIC | Age: 77
End: 2017-08-28
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
## 2017-08-28 DIAGNOSIS — M79.7 FIBROMYALGIA: Primary | Chronic | ICD-10-CM

## 2017-08-28 RX ORDER — HYDROCODONE BITARTRATE AND ACETAMINOPHEN 10; 325 MG/1; MG/1
1 TABLET ORAL EVERY 6 HOURS PRN
Qty: 120 TABLET | Refills: 0 | Status: SHIPPED | OUTPATIENT
Start: 2017-08-28 | End: 2017-11-10 | Stop reason: ALTCHOICE

## 2017-08-28 NOTE — TELEPHONE ENCOUNTER
Pt called stating that she fx her elbow and bruised her ribs and is taking 4 norco a day and is out. No appts are available for Dr. Everette Olsen since pt is going out of town early Wed morning.  Pt is going to see Dr. Colleen Urbina to get her norco. Nurse OK'd it this on

## 2017-08-28 NOTE — TELEPHONE ENCOUNTER
Please call patient  Patient fall on Wednesday last week and saw a Ortho  On Friday  because she has broken her elbow and bruised her ribs. She has not been in to have a ER Fu. Patient is needing a script of Norco for the rib pain.   We did not prescrib

## 2017-08-28 NOTE — TELEPHONE ENCOUNTER
Ok per Dr. Regalado Saliva for Rx of Rousseau to be refilled. The pt is to FU with the orthopedic as directed. Refill will be ready for  at the . Pt verbalized understanding.

## 2017-08-28 NOTE — TELEPHONE ENCOUNTER
The pt states Norco was not given to the pt from the orthopedic due to the [facility] script that the pt has had for fibromyalgia. The pt has since realized that she does not have enough of the medication at home and will need another prescription.  The pt state

## 2017-09-02 PROBLEM — Z79.4 UNCONTROLLED TYPE 2 DIABETES MELLITUS WITH HYPERGLYCEMIA, WITH LONG-TERM CURRENT USE OF INSULIN (HCC): Status: ACTIVE | Noted: 2017-09-02

## 2017-09-02 PROBLEM — E11.65 UNCONTROLLED TYPE 2 DIABETES MELLITUS WITH HYPERGLYCEMIA, WITH LONG-TERM CURRENT USE OF INSULIN (HCC): Status: ACTIVE | Noted: 2017-09-02

## 2017-09-08 ENCOUNTER — TELEPHONE (OUTPATIENT)
Dept: INTERNAL MEDICINE CLINIC | Facility: CLINIC | Age: 77
End: 2017-09-08

## 2017-09-08 ENCOUNTER — HOSPITAL ENCOUNTER (OUTPATIENT)
Dept: GENERAL RADIOLOGY | Age: 77
Discharge: HOME | End: 2017-09-08
Attending: INTERNAL MEDICINE
Payer: MEDICARE

## 2017-09-08 ENCOUNTER — OFFICE VISIT (OUTPATIENT)
Dept: INTERNAL MEDICINE CLINIC | Facility: CLINIC | Age: 77
End: 2017-09-08

## 2017-09-08 VITALS
TEMPERATURE: 98 F | RESPIRATION RATE: 12 BRPM | BODY MASS INDEX: 27.52 KG/M2 | HEIGHT: 60.25 IN | HEART RATE: 68 BPM | WEIGHT: 142 LBS | SYSTOLIC BLOOD PRESSURE: 130 MMHG | DIASTOLIC BLOOD PRESSURE: 78 MMHG

## 2017-09-08 DIAGNOSIS — Z23 NEED FOR INFLUENZA VACCINATION: ICD-10-CM

## 2017-09-08 DIAGNOSIS — M54.50 SEVERE LUMBAR PAIN: ICD-10-CM

## 2017-09-08 DIAGNOSIS — Z91.041 CONTRAST MEDIA ALLERGY: ICD-10-CM

## 2017-09-08 DIAGNOSIS — M54.50 SEVERE LUMBAR PAIN: Primary | ICD-10-CM
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
## 2017-09-08 DIAGNOSIS — R42 VERTIGO: ICD-10-CM

## 2017-09-08 PROCEDURE — 90653 IIV ADJUVANT VACCINE IM: CPT | Performed by: INTERNAL MEDICINE

## 2017-09-08 PROCEDURE — 99214 OFFICE O/P EST MOD 30 MIN: CPT | Performed by: INTERNAL MEDICINE

## 2017-09-08 PROCEDURE — G0008 ADMIN INFLUENZA VIRUS VAC: HCPCS | Performed by: INTERNAL MEDICINE

## 2017-09-08 PROCEDURE — 72110 X-RAY EXAM L-2 SPINE 4/>VWS: CPT | Performed by: INTERNAL MEDICINE

## 2017-09-08 RX ORDER — PREDNISONE 50 MG/1
TABLET ORAL
Qty: 3 TABLET | Refills: 0 | Status: SHIPPED | OUTPATIENT
Start: 2017-09-08 | End: 2017-09-21 | Stop reason: ALTCHOICE

## 2017-09-08 NOTE — TELEPHONE ENCOUNTER
Per pt, she fell and broke her arm a few weeks ago (has already seen Ortho for this) but then about 4 days ago fell again on her tailbone.  She has some pain medication but wants to know if she should be seen to evaluate or if additional pain meds can be pr

## 2017-09-08 NOTE — PROGRESS NOTES
HPI:    Patient ID: Guzman Donis is a 67year old female. Dizziness   This is a new problem. The current episode started more than 1 month ago. The problem occurs 2 to 4 times per day. The problem has been gradually worsening.  Associated symptoms in tablet Rfl: 0   DiphenhydrAMINE HCl 50 MG Oral Tab Take one (1) 50mg tablet by mouth one (1) hour before the examination. Disp: 1 tablet Rfl: 0   HYDROcodone-acetaminophen  MG Oral Tab Take 1 tablet by mouth every 6 (six) hours as needed for Pain.  Barry Granda W/DEVICE Does not apply Kit Dispense 1 OneTouch UltraSmart Meter Disp: 1 kit Rfl: 0   Hyoscyamine Sulfate (LEVSIN/SL) 0.125 MG Sublingual SL Tab Take 125 mcg by mouth every 6 (six) hours as needed for Cramping or Diarrhea.  Disp:  Rfl:    Beclomethasone Dip Bowel sounds are normal. She exhibits no distension. There is no tenderness. Musculoskeletal:        Lumbar back: She exhibits tenderness (L3 level). Neurological: She has normal strength. No cranial nerve deficit. She displays a negative Romberg sign.

## 2017-09-11 DIAGNOSIS — F40.240 CLAUSTROPHOBIA: ICD-10-CM

## 2017-09-11 RX ORDER — DIAZEPAM 5 MG/1
TABLET ORAL
Qty: 2 TABLET | Refills: 0 | OUTPATIENT
Start: 2017-09-11 | End: 2017-09-14

## 2017-09-11 NOTE — TELEPHONE ENCOUNTER
Pt wants to know if we can prescribe an anxiety medication for her MRI appt on 9/13.  Please call her back at [phone]

## 2017-09-11 NOTE — TELEPHONE ENCOUNTER
Per Dr Arturo Cartagena Diazepam 5mg 30 minutes prior to MRI may repeat at time of MRI. D/w pt above she expressed understanding. Rx called in.

## 2017-09-12 ENCOUNTER — MED REC SCAN ONLY (OUTPATIENT)
Dept: INTERNAL MEDICINE CLINIC | Facility: CLINIC | Age: 77
End: 2017-09-12

## 2017-09-13 ENCOUNTER — HOSPITAL ENCOUNTER (OUTPATIENT)
Dept: MRI IMAGING | Age: 77
Discharge: HOME | End: 2017-09-13
Attending: INTERNAL MEDICINE
Payer: MEDICARE

## 2017-09-13 DIAGNOSIS — R42 VERTIGO: ICD-10-CM

## 2017-09-13 PROCEDURE — A9575 INJ GADOTERATE MEGLUMI 0.1ML: HCPCS

## 2017-09-13 PROCEDURE — 70553 MRI BRAIN STEM W/O & W/DYE: CPT | Performed by: INTERNAL MEDICINE

## 2017-09-14 ENCOUNTER — TELEPHONE (OUTPATIENT)
Dept: INTERNAL MEDICINE CLINIC | Facility: CLINIC | Age: 77
End: 2017-09-14

## 2017-09-14 DIAGNOSIS — F40.240 CLAUSTROPHOBIA: ICD-10-CM

## 2017-09-14 DIAGNOSIS — R42 DIZZINESS: Primary | ICD-10-CM

## 2017-09-14 DIAGNOSIS — Z91.041: ICD-10-CM

## 2017-09-14 RX ORDER — PREDNISONE 50 MG/1
TABLET ORAL
Qty: 3 TABLET | Refills: 0 | Status: CANCELLED | OUTPATIENT
Start: 2017-09-14

## 2017-09-14 RX ORDER — PREDNISONE 10 MG/1
10 TABLET ORAL DAILY
Qty: 5 TABLET | Refills: 0 | Status: SHIPPED | OUTPATIENT
Start: 2017-09-14 | End: 2017-09-21 | Stop reason: ALTCHOICE

## 2017-09-14 RX ORDER — DIAZEPAM 5 MG/1
TABLET ORAL
Qty: 2 TABLET | Refills: 0 | Status: SHIPPED
Start: 2017-09-14 | End: 2017-11-10 | Stop reason: ALTCHOICE

## 2017-09-14 NOTE — TELEPHONE ENCOUNTER
----- Message from Nayeli Woods MD sent at 9/14/2017  8:48 AM CDT -----  right frontal parietal region may represent bony exostosis or possibly a meningioma.   Order CT head examination with and without contrast

## 2017-09-14 NOTE — TELEPHONE ENCOUNTER
Spoke with patient and relayed test results. She verbalized understanding and agreed with plan. She is requesting a refill on the Valium to get there through the test.     Ok per Dr. Anais Clarke to refill.    All orders placed

## 2017-09-14 NOTE — TELEPHONE ENCOUNTER
Contrast allergy. Ok per Dr. Mikal Bosch to pre-treat for contrast allergy, still recommends CT w and w/out. Orders pending.

## 2017-09-15 ENCOUNTER — TELEPHONE (OUTPATIENT)
Dept: INTERNAL MEDICINE CLINIC | Facility: CLINIC | Age: 77
End: 2017-09-15

## 2017-09-15 NOTE — TELEPHONE ENCOUNTER
Pt would like radiologist to look at other MRI results from the past, would like to speak to a nurse about this

## 2017-09-15 NOTE — TELEPHONE ENCOUNTER
Spoke with patient and relayed MRI results again and Dr. Joseph Alcaraz recommendations. She verbalized understanding and stated she will discuss with her  and decide on completing CT scan.

## 2017-09-18 ENCOUNTER — HOSPITAL ENCOUNTER (OUTPATIENT)
Dept: CT IMAGING | Age: 77
Discharge: HOME | End: 2017-09-18
Attending: INTERNAL MEDICINE
Payer: MEDICARE

## 2017-09-18 ENCOUNTER — TELEPHONE (OUTPATIENT)
Dept: INTERNAL MEDICINE CLINIC | Facility: CLINIC | Age: 77
End: 2017-09-18

## 2017-09-18 DIAGNOSIS — R42 DIZZINESS: ICD-10-CM

## 2017-09-18 DIAGNOSIS — Z91.041: ICD-10-CM

## 2017-09-18 DIAGNOSIS — F40.240 CLAUSTROPHOBIA: ICD-10-CM

## 2017-09-18 PROCEDURE — 70470 CT HEAD/BRAIN W/O & W/DYE: CPT | Performed by: INTERNAL MEDICINE

## 2017-09-18 NOTE — TELEPHONE ENCOUNTER
Patient called and requested a call back from RN regarding her medication intake before her MRI. Please advise.

## 2017-09-18 NOTE — TELEPHONE ENCOUNTER
Pt states will be having a \"different kind of an MRI\" to measure a bone spur. Discussed in detail the pt is to take the following for contrast allergy protocol:     Prednisone 50mg 13, 7 and 1 hour prior to the procedure.  The pt is to also to take Be

## 2017-09-19 ENCOUNTER — TELEPHONE (OUTPATIENT)
Dept: INTERNAL MEDICINE CLINIC | Facility: CLINIC | Age: 77
End: 2017-09-19

## 2017-09-19 DIAGNOSIS — R42 DIZZINESS: Primary | ICD-10-CM

## 2017-09-19 NOTE — TELEPHONE ENCOUNTER
AZIZA ROQUE. D/w pt she sees a neurologist at [facility], [place] and will f/u with them as well as ENT.

## 2017-09-19 NOTE — TELEPHONE ENCOUNTER
----- Message from Ramsey Alvarez MD sent at 9/19/2017  8:59 AM CDT -----  Normal CT brain. No acute process. Refer to neurology for eval of dizziness.  Dr. Zuly Friend

## 2017-09-21 PROBLEM — H90.3 SENSORINEURAL HEARING LOSS (SNHL) OF BOTH EARS: Status: ACTIVE | Noted: 2017-09-21

## 2017-09-25 ENCOUNTER — TELEPHONE (OUTPATIENT)
Dept: INTERNAL MEDICINE CLINIC | Facility: CLINIC | Age: 77
End: 2017-09-25

## 2017-09-25 ENCOUNTER — TELEPHONE (OUTPATIENT)
Dept: RHEUMATOLOGY | Facility: CLINIC | Age: 77
End: 2017-09-25

## 2017-09-25 DIAGNOSIS — M47.816 SPONDYLOSIS OF LUMBAR REGION WITHOUT MYELOPATHY OR RADICULOPATHY: ICD-10-CM

## 2017-09-25 DIAGNOSIS — M79.7 FIBROMYALGIA: Primary | Chronic | ICD-10-CM

## 2017-09-25 NOTE — TELEPHONE ENCOUNTER
Pt left a message asking for a physical therapy order for water aerobics be sent to [facility], fax#[phone], ph#[phone]. Please call her once that has been sent.

## 2017-09-25 NOTE — TELEPHONE ENCOUNTER
Pt called stating she has fallen twice since last visit, broke arm, is being worked up by neurologist. Pt requesting precription for water aerobics/physical therapy. Informed pt Dr. Rigo Mueller is OOT, suggested she call her PCP for prescription.  Pt stated she

## 2017-09-25 NOTE — TELEPHONE ENCOUNTER
Spoke with patient and she states Dr. Ovidio Martinez had recommended she start water aerobic classes to help manage her low back pain and fibromyalgia. Patient states she can do this through [facility] but she needs an order.      I called Dr. Meet Stephenson office an

## 2017-09-26 ENCOUNTER — HOSPITAL ENCOUNTER (OUTPATIENT)
Dept: MAMMOGRAPHY | Age: 77
Discharge: HOME | End: 2017-09-26
Attending: PHYSICIAN ASSISTANT
Payer: MEDICARE

## 2017-09-26 DIAGNOSIS — Z12.31 VISIT FOR SCREENING MAMMOGRAM: ICD-10-CM

## 2017-09-26 PROCEDURE — 77067 SCR MAMMO BI INCL CAD: CPT | Performed by: PHYSICIAN ASSISTANT

## 2017-09-27 ENCOUNTER — TELEPHONE (OUTPATIENT)
Dept: INTERNAL MEDICINE CLINIC | Facility: CLINIC | Age: 77
End: 2017-09-27

## 2017-09-27 DIAGNOSIS — N64.89 BREAST ASYMMETRY: Primary | ICD-10-CM

## 2017-09-27 DIAGNOSIS — Z80.3 FAMILY HISTORY OF BREAST CANCER: ICD-10-CM

## 2017-09-27 NOTE — TELEPHONE ENCOUNTER
----- Message from Krishan Fitzgerald PA-C sent at 9/27/2017 12:57 PM CDT -----  Please inform patient additional views recommended due to asymmetry of breasts.  Please offer her referral to [address] [place] [address] if she would like, due to family his

## 2017-09-27 NOTE — TELEPHONE ENCOUNTER
[facility] called, per pt's request, to have mammogram order faxed to them at fax #[phone].

## 2017-09-27 NOTE — TELEPHONE ENCOUNTER
AZIZA ROQUE. Orders placed by dept. D/w pt results and recommendations. She expressed understanding. She may have additional views done at another facility as she does not want to wait until 10/9/17 to complete.   If so she will forward results to our

## 2017-09-27 NOTE — PROGRESS NOTES
Please inform patient additional views recommended due to asymmetry of breasts. Please offer her referral to [address] [place] [address] if she would like, due to family history.

## 2017-10-02 ENCOUNTER — MED REC SCAN ONLY (OUTPATIENT)
Dept: INTERNAL MEDICINE CLINIC | Facility: CLINIC | Age: 77
End: 2017-10-02

## 2017-10-04 ENCOUNTER — OFFICE VISIT (OUTPATIENT)
Dept: GENETICS | Age: 77
End: 2017-10-04
Attending: GENETIC COUNSELOR, MS
Payer: MEDICARE

## 2017-10-04 DIAGNOSIS — Z80.3 FAMILY HISTORY OF BREAST CANCER: ICD-10-CM

## 2017-10-04 PROCEDURE — 36415 COLL VENOUS BLD VENIPUNCTURE: CPT

## 2017-10-04 PROCEDURE — 96040 HC GENETIC COUNSELING EA 30 MIN: CPT | Performed by: GENETIC COUNSELOR, MS

## 2017-10-18 ENCOUNTER — GENETICS ENCOUNTER (OUTPATIENT)
Dept: HEMATOLOGY/ONCOLOGY | Facility: HOSPITAL | Age: 77
End: 2017-10-18

## 2017-10-18 NOTE — PROGRESS NOTES
Referring Provider:       Luly Aleman MD/Gabby Sexton PA-C    Reason for Referral:  Radha Peterson had genetic testing performed on 10/4/17 because of a family history of breast cancer.      Genetic Testing Result:  No mutation was found in the foll

## 2017-10-23 ENCOUNTER — TELEPHONE (OUTPATIENT)
Dept: RHEUMATOLOGY | Facility: CLINIC | Age: 77
End: 2017-10-23

## 2017-10-23 NOTE — TELEPHONE ENCOUNTER
Pt called requesting refill on Norco. Pt LOV 3/6/17  Future Appointments  Date Time Provider Carmen Kenisha   10/26/2017 2:00 PM Farhad Young MD  [facility]   11/10/2017 11:30 AM Kendra Cain MD [facility] [address]   11/20/2017 2:00 PM Bh

## 2017-11-10 ENCOUNTER — OFFICE VISIT (OUTPATIENT)
Dept: INTERNAL MEDICINE CLINIC | Facility: CLINIC | Age: 77
End: 2017-11-10

## 2017-11-10 VITALS
HEIGHT: 60.25 IN | RESPIRATION RATE: 16 BRPM | HEART RATE: 67 BPM | DIASTOLIC BLOOD PRESSURE: 88 MMHG | OXYGEN SATURATION: 97 % | SYSTOLIC BLOOD PRESSURE: 122 MMHG | WEIGHT: 144 LBS | BODY MASS INDEX: 27.9 KG/M2 | TEMPERATURE: 98 F

## 2017-11-10 DIAGNOSIS — E11.40 TYPE 2 DIABETES, UNCONTROLLED, WITH NEUROPATHY (HCC): Primary | ICD-10-CM

## 2017-11-10 DIAGNOSIS — E11.65 TYPE 2 DIABETES, UNCONTROLLED, WITH NEUROPATHY (HCC): Primary | ICD-10-CM

## 2017-11-10 DIAGNOSIS — R42 VERTIGO: ICD-10-CM

## 2017-11-10 PROBLEM — H90.3 SENSORINEURAL HEARING LOSS (SNHL) OF BOTH EARS: Status: RESOLVED | Noted: 2017-09-21 | Resolved: 2017-11-10

## 2017-11-10 PROCEDURE — 99214 OFFICE O/P EST MOD 30 MIN: CPT | Performed by: INTERNAL MEDICINE

## 2017-11-10 NOTE — PROGRESS NOTES
HPI:    Patient ID: Loyda Ornelas is a 67year old female. HPI  HPI:   Loyda Ornelas is a 67year old female who presents for recheck of her diabetes. Patient’s FBS have been elevated. Pt has been checking her feet on a regular basis.  Pt denies a Dipropionate Aug 0.05 % External Cream Apply to external ear bid with q-tip for 2 weeks , repeat as necessary Disp: 1 Tube Rfl: 5   Pantoprazole Sodium 20 MG Oral Tab EC 1 po 1/2 hr prior to breakfast Disp: 90 tablet Rfl: 0   RaNITidine HCl 300 MG Oral Tab Dispense 1 OneTouch UltraSmart Meter Disp: 1 kit Rfl: 0   Hyoscyamine Sulfate (LEVSIN/SL) 0.125 MG Sublingual SL Tab Take 125 mcg by mouth every 6 (six) hours as needed for Cramping or Diarrhea.  Disp:  Rfl:    Beclomethasone Dipropionate (QNASL) 80 MCG/ACT Past Surgical History:  No date: CATARACT      Comment: Bilateral cataract extractions (9/2015)  No date: CHOLECYSTECTOMY  No date: COLONOSCOPY  7/26/2012: FLUOR GID & LOCLZJ NDL/CATH SPI DX/THER [facility]      Comment: Procedure: CERVICAL EPIDURAL;  Surgeon: chest pain on exertion  GI: denies abdominal pain and denies heartburn  NEURO: denies headaches    EXAM:   /88   Pulse 67   Temp 98.4 °F (36.9 °C) (Oral)   Resp 16   Ht 60.25\"   Wt 144 lb   SpO2 97%   Breastfeeding?  No   BMI 27.89 kg/m²   GENERAL: w mouth every morning before breakfast. Disp: 90 tablet Rfl: 1   Insulin Aspart Pen (NOVOLOG FLEXPEN) 100 UNIT/ML Subcutaneous Solution Pen-injector Take 5 Units with breakfast, lunch and dinner Disp: 5 pen Rfl: 1   insulin glargine (LANTUS SOLOSTAR) 100 UNI needed.) Disp: 8.7 g Rfl: 3   ONETOUCH DELICA LANCETS Does not apply Misc For insulin use 3 times per day Disp: 300 each Rfl: 1   MELATONIN 1 tablet nightly.  Disp:  Rfl:    VAGIFEM 10 MCG Vaginal Tab INSERT 1 TABLET (10 MCG) BY VAGINAL ROUTE TWICE WEEKLY (

## 2017-11-12 ENCOUNTER — APPOINTMENT (OUTPATIENT)
Dept: GENERAL RADIOLOGY | Age: 77
End: 2017-11-12
Payer: MEDICARE

## 2017-11-12 ENCOUNTER — HOSPITAL ENCOUNTER (EMERGENCY)
Age: 77
Discharge: HOME | End: 2017-11-12
Attending: EMERGENCY MEDICINE
Payer: MEDICARE

## 2017-11-12 VITALS
HEART RATE: 72 BPM | TEMPERATURE: 98 F | DIASTOLIC BLOOD PRESSURE: 91 MMHG | WEIGHT: 136 LBS | BODY MASS INDEX: 25.68 KG/M2 | OXYGEN SATURATION: 94 % | HEIGHT: 61 IN | SYSTOLIC BLOOD PRESSURE: 143 MMHG | RESPIRATION RATE: 16 BRPM

## 2017-11-12 DIAGNOSIS — M25.422 EFFUSION OF LEFT ELBOW: Primary | ICD-10-CM

## 2017-11-12 PROCEDURE — 99283 EMERGENCY DEPT VISIT LOW MDM: CPT

## 2017-11-12 PROCEDURE — 29105 APPLICATION LONG ARM SPLINT: CPT

## 2017-11-12 PROCEDURE — 73080 X-RAY EXAM OF ELBOW: CPT

## 2017-11-12 RX ORDER — IBUPROFEN 600 MG/1
600 TABLET ORAL ONCE
Status: COMPLETED | OUTPATIENT
Start: 2017-11-12 | End: 2017-11-12

## 2017-11-12 NOTE — ED PROVIDER NOTES
Patient Seen in: [facility] [place]    History   Patient presents with:  Upper Extremity Injury (musculoskeletal)    Stated Complaint: LEFT ARM PAIN     HPI    Grabiel Jean is a 54-year-old female who presents today for evaluation of lef History:  No date: CATARACT      Comment: Bilateral cataract extractions (9/2015)  No date: CHOLECYSTECTOMY  No date: COLONOSCOPY  7/26/2012: FLUOR GID & LOCLZJ NDL/CATH SPI DX/THER [facility]      Comment: Procedure: CERVICAL EPIDURAL;  Surgeon:                Avi Barker °C)  Temp src: Temporal  SpO2: 96 %  O2 Device: None (Room air)    Current:BP (!) 173/95   Pulse 79   Temp 98.2 °F (36.8 °C) (Temporal)   Resp 18   Ht 154.9 cm (5' 1\")   Wt 61.7 kg   SpO2 96%   BMI 25.70 kg/m²         Physical Exam   Constitutional: She i fracture is not identified. Dictated by: Elin Bazzi MD on 11/12/2017 at 10:39     Approved by: Elin Bazzi MD          A long arm fiberglass postmold was placed on the patient that is secured with ace wraps.   The affected area and the join 11:56 am    Follow-up:  Bertha Dowling MD  [address] [id number] [phone]    Call in 1 day  re-evaluation of left elbow        Medications Prescribed:  Current Discharge Medication List

## 2017-11-20 PROBLEM — E10.65 UNCONTROLLED TYPE 1 DIABETES MELLITUS WITH HYPERGLYCEMIA (HCC): Status: ACTIVE | Noted: 2017-11-20

## 2017-11-20 PROBLEM — E11.65 UNCONTROLLED TYPE 2 DIABETES MELLITUS WITH HYPERGLYCEMIA, WITH LONG-TERM CURRENT USE OF INSULIN (HCC): Status: RESOLVED | Noted: 2017-09-02 | Resolved: 2017-11-20

## 2017-11-20 PROBLEM — E10.65 TYPE 1 DIABETES, UNCONTROLLED, WITH NEUROPATHY (HCC): Status: ACTIVE | Noted: 2017-11-20

## 2017-11-20 PROBLEM — Z79.4 UNCONTROLLED TYPE 2 DIABETES MELLITUS WITH HYPERGLYCEMIA, WITH LONG-TERM CURRENT USE OF INSULIN (HCC): Status: RESOLVED | Noted: 2017-09-02 | Resolved: 2017-11-20

## 2017-11-20 PROBLEM — IMO0002 TYPE 1 DIABETES, UNCONTROLLED, WITH NEUROPATHY: Status: ACTIVE | Noted: 2017-11-20

## 2017-11-20 PROBLEM — E10.40 TYPE 1 DIABETES, UNCONTROLLED, WITH NEUROPATHY (HCC): Status: ACTIVE | Noted: 2017-11-20

## 2017-11-21 ENCOUNTER — LABORATORY ENCOUNTER (OUTPATIENT)
Dept: LAB | Age: 77
End: 2017-11-21
Attending: INTERNAL MEDICINE
Payer: MEDICARE

## 2017-11-21 DIAGNOSIS — E11.65 UNCONTROLLED TYPE 2 DIABETES MELLITUS WITH HYPERGLYCEMIA, WITH LONG-TERM CURRENT USE OF INSULIN (HCC): ICD-10-CM

## 2017-11-21 DIAGNOSIS — E11.65 TYPE 2 DIABETES, UNCONTROLLED, WITH NEUROPATHY (HCC): ICD-10-CM

## 2017-11-21 DIAGNOSIS — Z79.4 UNCONTROLLED TYPE 2 DIABETES MELLITUS WITH HYPERGLYCEMIA, WITH LONG-TERM CURRENT USE OF INSULIN (HCC): ICD-10-CM

## 2017-11-21 DIAGNOSIS — E66.3 OVERWEIGHT (BMI 25.0-29.9): ICD-10-CM

## 2017-11-21 DIAGNOSIS — E11.40 TYPE 2 DIABETES, UNCONTROLLED, WITH NEUROPATHY (HCC): ICD-10-CM

## 2017-11-21 DIAGNOSIS — E89.0 POSTSURGICAL HYPOTHYROIDISM: ICD-10-CM

## 2017-11-21 PROCEDURE — 36415 COLL VENOUS BLD VENIPUNCTURE: CPT

## 2017-11-21 PROCEDURE — 84443 ASSAY THYROID STIM HORMONE: CPT

## 2017-12-04 NOTE — PROGRESS NOTES
(THE BELOW MESSAGE IS BEING RELAYED BY THE RESULT MYCHART NOTE). Dear Lexi Sanchez,    I know you already saw these results. Your thyroid test came back normal.    Please stay on the levothyroxine as you are already doing. You don't have to write back.

## 2017-12-22 ENCOUNTER — OFFICE VISIT (OUTPATIENT)
Dept: RHEUMATOLOGY | Facility: CLINIC | Age: 77
End: 2017-12-22

## 2017-12-22 VITALS
HEART RATE: 68 BPM | RESPIRATION RATE: 12 BRPM | BODY MASS INDEX: 26 KG/M2 | SYSTOLIC BLOOD PRESSURE: 134 MMHG | WEIGHT: 136 LBS | DIASTOLIC BLOOD PRESSURE: 70 MMHG

## 2017-12-22 DIAGNOSIS — M35.01 SJOGREN'S SYNDROME WITH KERATOCONJUNCTIVITIS SICCA (HCC): ICD-10-CM

## 2017-12-22 DIAGNOSIS — M79.7 FIBROMYALGIA: Primary | Chronic | ICD-10-CM

## 2017-12-22 DIAGNOSIS — M47.816 OSTEOARTHRITIS OF LUMBAR SPINE, UNSPECIFIED SPINAL OSTEOARTHRITIS COMPLICATION STATUS: ICD-10-CM

## 2017-12-22 PROCEDURE — 99213 OFFICE O/P EST LOW 20 MIN: CPT | Performed by: INTERNAL MEDICINE

## 2017-12-22 RX ORDER — HYDROCODONE BITARTRATE AND ACETAMINOPHEN 10; 325 MG/1; MG/1
1 TABLET ORAL EVERY 6 HOURS PRN
Qty: 120 TABLET | Refills: 0 | Status: SHIPPED | OUTPATIENT
Start: 2018-01-21 | End: 2018-02-14 | Stop reason: ALTCHOICE

## 2017-12-22 RX ORDER — HYDROCODONE BITARTRATE AND ACETAMINOPHEN 10; 325 MG/1; MG/1
1 TABLET ORAL EVERY 6 HOURS PRN
Qty: 120 TABLET | Refills: 0 | Status: SHIPPED | OUTPATIENT
Start: 2018-02-20 | End: 2018-02-14 | Stop reason: ALTCHOICE

## 2017-12-22 RX ORDER — HYDROCODONE BITARTRATE AND ACETAMINOPHEN 10; 325 MG/1; MG/1
1 TABLET ORAL EVERY 6 HOURS PRN
Qty: 120 TABLET | Refills: 0 | Status: SHIPPED | OUTPATIENT
Start: 2017-12-22 | End: 2018-01-21

## 2017-12-22 NOTE — PROGRESS NOTES
[facility]  Dr. Ginny Pappas Progress Note     Subjective:   Guzman Donis is a(n) 67year old female. Current complaints: Patient presents with:  Fibromyalgia Syndrome: 6 month f/u. Pt states 'having some aches and pains in shoulders.  Had a bad summe

## 2017-12-22 NOTE — PATIENT INSTRUCTIONS
Continue to use Norco 10 mg 1 tablet every 6 hours, generally 1-2 a day as needed for arthritis pain. In addition to Leandra Fortune he can take extra strength Tylenol 2 tablets now and then if it helps. Continue to exercise regularly.   For your osteopenia take ca

## 2017-12-27 DIAGNOSIS — I10 HYPERTENSION, ESSENTIAL, BENIGN: ICD-10-CM

## 2017-12-27 RX ORDER — VERAPAMIL HYDROCHLORIDE 120 MG/1
TABLET, FILM COATED ORAL
Qty: 270 TABLET | Refills: 1 | Status: SHIPPED | OUTPATIENT
Start: 2017-12-27 | End: 2018-12-15

## 2018-01-02 DIAGNOSIS — E11.40 TYPE 2 DIABETES, UNCONTROLLED, WITH NEUROPATHY (HCC): ICD-10-CM

## 2018-01-02 DIAGNOSIS — E11.65 TYPE 2 DIABETES, UNCONTROLLED, WITH NEUROPATHY (HCC): ICD-10-CM

## 2018-01-03 RX ORDER — LIRAGLUTIDE 6 MG/ML
INJECTION SUBCUTANEOUS
Qty: 9 ML | Refills: 5 | Status: SHIPPED | OUTPATIENT
Start: 2018-01-03 | End: 2018-07-21

## 2018-01-05 DIAGNOSIS — F34.1 DYSTHYMIA: ICD-10-CM

## 2018-01-05 DIAGNOSIS — I10 HYPERTENSION, ESSENTIAL, BENIGN: ICD-10-CM

## 2018-01-05 DIAGNOSIS — E78.5 HYPERLIPIDEMIA LDL GOAL <100: ICD-10-CM

## 2018-01-08 ENCOUNTER — TELEPHONE (OUTPATIENT)
Dept: INTERNAL MEDICINE CLINIC | Facility: CLINIC | Age: 78
End: 2018-01-08

## 2018-01-08 RX ORDER — METOPROLOL SUCCINATE 25 MG/1
TABLET, EXTENDED RELEASE ORAL
Qty: 90 TABLET | Refills: 0 | Status: SHIPPED | OUTPATIENT
Start: 2018-01-08 | End: 2018-02-19

## 2018-01-08 RX ORDER — IRBESARTAN 300 MG/1
TABLET ORAL
Qty: 90 TABLET | Refills: 0 | Status: SHIPPED | OUTPATIENT
Start: 2018-01-08 | End: 2018-02-19

## 2018-01-08 RX ORDER — BUPROPION HYDROCHLORIDE 300 MG/1
TABLET ORAL
Qty: 90 TABLET | Refills: 0 | Status: SHIPPED | OUTPATIENT
Start: 2018-01-08 | End: 2018-02-19

## 2018-01-08 RX ORDER — ATORVASTATIN CALCIUM 40 MG/1
TABLET, FILM COATED ORAL
Qty: 90 TABLET | Refills: 0 | Status: SHIPPED | OUTPATIENT
Start: 2018-01-08 | End: 2018-02-19

## 2018-01-08 NOTE — TELEPHONE ENCOUNTER
Spoke with pt she has the following symptoms: vomiting and diarrhea. She denies fever, cough, sore throat, body aches, or headache.   D/w pt her symptoms are most likely related to stomach flu and not the respiratory flu therefore tamiflu is not recommende

## 2018-01-08 NOTE — TELEPHONE ENCOUNTER
Pt's  called to ask for a prescription of tamaflu for the pt - she has had diarrhea and vomiting since early this morning. Please c/b at [phone].

## 2018-02-14 ENCOUNTER — OFFICE VISIT (OUTPATIENT)
Dept: INTERNAL MEDICINE CLINIC | Facility: CLINIC | Age: 78
End: 2018-02-14

## 2018-02-14 VITALS
DIASTOLIC BLOOD PRESSURE: 74 MMHG | BODY MASS INDEX: 25.68 KG/M2 | OXYGEN SATURATION: 97 % | WEIGHT: 136 LBS | RESPIRATION RATE: 16 BRPM | TEMPERATURE: 98 F | HEIGHT: 61 IN | HEART RATE: 82 BPM | SYSTOLIC BLOOD PRESSURE: 116 MMHG

## 2018-02-14 DIAGNOSIS — E78.5 HYPERLIPIDEMIA ASSOCIATED WITH TYPE 2 DIABETES MELLITUS (HCC): ICD-10-CM

## 2018-02-14 DIAGNOSIS — E10.65 UNCONTROLLED TYPE 1 DIABETES MELLITUS WITH HYPERGLYCEMIA (HCC): Primary | ICD-10-CM

## 2018-02-14 DIAGNOSIS — E11.59 HYPERTENSION ASSOCIATED WITH DIABETES (HCC): ICD-10-CM

## 2018-02-14 DIAGNOSIS — M54.50 ACUTE RIGHT-SIDED LOW BACK PAIN WITHOUT SCIATICA: ICD-10-CM

## 2018-02-14 DIAGNOSIS — E11.69 HYPERLIPIDEMIA ASSOCIATED WITH TYPE 2 DIABETES MELLITUS (HCC): ICD-10-CM

## 2018-02-14 DIAGNOSIS — I15.2 HYPERTENSION ASSOCIATED WITH DIABETES (HCC): ICD-10-CM

## 2018-02-14 PROCEDURE — 99214 OFFICE O/P EST MOD 30 MIN: CPT | Performed by: INTERNAL MEDICINE

## 2018-02-14 RX ORDER — ACETAMINOPHEN 500 MG
TABLET ORAL
COMMUNITY
Start: 2017-06-06

## 2018-02-14 RX ORDER — CYCLOBENZAPRINE HCL 10 MG
10 TABLET ORAL NIGHTLY
Qty: 5 TABLET | Refills: 0 | Status: SHIPPED | OUTPATIENT
Start: 2018-02-14 | End: 2018-08-10 | Stop reason: ALTCHOICE

## 2018-02-14 NOTE — PROGRESS NOTES
HPI:    Patient ID: Meme Clement is a 65year old female. HPI  HPI:   Meme Clement is a 65year old female who presents for recheck of her diabetes, htn and hyperlipidemia.  Patient’s FBS have been at goal and dexcom reports at goal accu checks d Tab  Disp:  Rfl:    Cyclobenzaprine HCl 10 MG Oral Tab Take 1 tablet (10 mg total) by mouth nightly. Disp: 5 tablet Rfl: 0   Pantoprazole Sodium 40 MG Oral Tab EC Take 1 tablet (40 mg total) by mouth 2 (two) times daily before meals.  Disp: 180 tablet Rfl: Subcutaneous Solution Pen-injector Take 10 Units at bedtime (9-10 pm) Disp:  Rfl:    FLUoxetine HCl 20 MG Oral Cap Take 1 capsule (20 mg total) by mouth daily.  Disp: 90 capsule Rfl: 1   Glucose Blood (ONETOUCH ULTRA BLUE) In Vitro Strip Check blood sugars (irritable bowel syndrome)    • Irregular bowel habits    • Nausea    •  (normal spontaneous vaginal delivery)     x 3 (1960, , )   • OSTEOARTHRITIS    • Pain in joints    • Pain with bowel movements    • Pain, upper back 2012    between sh HISTORY      Comment: bilat carpal tunnel surgery  No date: THYROIDECTOMY      Comment: In 1984 - Total thyroidectomy for benign                thyroid disease  No date: TONSILLECTOMY  No date: TOTAL KNEE REPLACEMENT      Comment: ROBERT   Social History: Smo statin  Recommendations are: continue present meds, check HgbA1C,  fasting lipids and CMP, lose wgt with carbohydrate controlled diet and exercise, refer to Ophthamology, check feet daily.   The patient indicates understanding of these issues and agrees to OneTouch Mini Meter Disp: 1 kit Rfl: 0   ONETOUCH ULTRA BLUE In Vitro Strip Check blood sugars 4 times per day Disp: 400 strip Rfl: 1   Betamethasone Dipropionate Aug 0.05 % External Cream Apply to external ear bid with q-tip for 2 weeks , repeat as necess BETADINE  Meclizine               Rash, Other (See Comments)    Comment:Sores inside mouth  Mri [Gadolinium Rj*    Tightness in Throat    Comment:PT STATES SHE THINKS SHE HAD REACTION TO CONTRAST             FOR AN MRI YEARS AGO. IODINE MARKED IN CHART.

## 2018-02-19 ENCOUNTER — OFFICE VISIT (OUTPATIENT)
Dept: INTERNAL MEDICINE CLINIC | Facility: CLINIC | Age: 78
End: 2018-02-19

## 2018-02-19 ENCOUNTER — TELEPHONE (OUTPATIENT)
Dept: INTERNAL MEDICINE CLINIC | Facility: CLINIC | Age: 78
End: 2018-02-19

## 2018-02-19 VITALS
RESPIRATION RATE: 16 BRPM | BODY MASS INDEX: 25.3 KG/M2 | TEMPERATURE: 99 F | WEIGHT: 134 LBS | HEIGHT: 61 IN | OXYGEN SATURATION: 98 % | DIASTOLIC BLOOD PRESSURE: 88 MMHG | HEART RATE: 78 BPM | SYSTOLIC BLOOD PRESSURE: 138 MMHG

## 2018-02-19 DIAGNOSIS — I10 HYPERTENSION, ESSENTIAL, BENIGN: ICD-10-CM

## 2018-02-19 DIAGNOSIS — E78.5 HYPERLIPIDEMIA LDL GOAL <100: ICD-10-CM

## 2018-02-19 DIAGNOSIS — F34.1 DYSTHYMIA: ICD-10-CM

## 2018-02-19 DIAGNOSIS — S41.151A DOG BITE OF ARM, RIGHT, INITIAL ENCOUNTER: Primary | ICD-10-CM
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
## 2018-02-19 PROCEDURE — 99214 OFFICE O/P EST MOD 30 MIN: CPT | Performed by: INTERNAL MEDICINE

## 2018-02-19 RX ORDER — AMOXICILLIN AND CLAVULANATE POTASSIUM 875; 125 MG/1; MG/1
1 TABLET, FILM COATED ORAL 2 TIMES DAILY
Qty: 20 TABLET | Refills: 0 | Status: SHIPPED | OUTPATIENT
Start: 2018-02-19 | End: 2018-03-01

## 2018-02-19 NOTE — PATIENT INSTRUCTIONS
Dog Bite  A dog bite can cause a wound deep enough to break the skin. In such cases, the wound is cleaned and sometimes closed. If the wound is closed, it is usually not completely closed.  This is so that fluid can drain if the wound becomes infected. Of · If someone’s pet dog has bitten you, it should be kept in a secure area for the next 10 days to watch for signs of illness.  (If the pet owner won’t allow this, contact your local animal control center.) If the dog becomes ill or dies during that time, co

## 2018-02-19 NOTE — PROGRESS NOTES
HPI:    Patient ID: Dav Foster is a 65year old female. Trauma   This is a new problem. The current episode started today. The problem has been unchanged.  Pertinent negatives include no chest pain, coughing, diaphoresis, fatigue, fever, vertigo or METOPROLOL SUCCINATE ER 25 MG Oral Tablet 24 Hr TAKE ONE TABLET BY MOUTH ONE TIME DAILY  Disp: 90 tablet Rfl: 0   VICTOZA 18 MG/3ML Subcutaneous Solution Pen-injector INJECT 1.8MG INTO SKIN DAILY Disp: 9 mL Rfl: 5   RANITIDINE  MG Oral Tab TAKE ONE VAGIFEM 10 MCG Vaginal Tab INSERT 1 TABLET (10 MCG) BY VAGINAL ROUTE TWICE WEEKLY (Patient taking differently: INSERT 1 TABLET (10 MCG) BY VAGINAL ROUTE three times wEEKLY) Disp: 18 Tab Rfl: 0   VITAMIN D 2000 UNIT OR TABS 1 TABLET DAILY Disp:  Rfl:    SB

## 2018-02-19 NOTE — TELEPHONE ENCOUNTER
Pt says she got a dog bite on her arm and it is swollen and possibly infected - she tried going to an immediate care clinic but was told there was a 2 hour wait. Please c/b to let her know when she can be seen.

## 2018-02-20 RX ORDER — BUPROPION HYDROCHLORIDE 300 MG/1
TABLET ORAL
Qty: 90 TABLET | Refills: 1 | Status: SHIPPED | OUTPATIENT
Start: 2018-02-20 | End: 2018-10-01

## 2018-02-20 RX ORDER — IRBESARTAN 300 MG/1
TABLET ORAL
Qty: 90 TABLET | Refills: 2 | Status: SHIPPED | OUTPATIENT
Start: 2018-02-20 | End: 2019-03-20

## 2018-02-20 RX ORDER — FLUOXETINE HYDROCHLORIDE 20 MG/1
CAPSULE ORAL
Qty: 90 CAPSULE | Refills: 1 | Status: SHIPPED | OUTPATIENT
Start: 2018-02-20 | End: 2018-08-11

## 2018-02-20 RX ORDER — ATORVASTATIN CALCIUM 40 MG/1
TABLET, FILM COATED ORAL
Qty: 90 TABLET | Refills: 2 | Status: SHIPPED | OUTPATIENT
Start: 2018-02-20 | End: 2018-11-16

## 2018-02-20 RX ORDER — METOPROLOL SUCCINATE 25 MG/1
TABLET, EXTENDED RELEASE ORAL
Qty: 90 TABLET | Refills: 2 | Status: SHIPPED | OUTPATIENT
Start: 2018-02-20 | End: 2019-01-07

## 2018-02-22 ENCOUNTER — LAB ENCOUNTER (OUTPATIENT)
Dept: LAB | Age: 78
End: 2018-02-22
Attending: INTERNAL MEDICINE
Payer: MEDICARE

## 2018-02-22 DIAGNOSIS — E10.65 UNCONTROLLED TYPE 1 DIABETES MELLITUS WITH HYPERGLYCEMIA (HCC): ICD-10-CM

## 2018-02-22 DIAGNOSIS — E89.0 POSTSURGICAL HYPOTHYROIDISM: ICD-10-CM

## 2018-02-22 DIAGNOSIS — E66.3 OVERWEIGHT (BMI 25.0-29.9): ICD-10-CM

## 2018-02-22 DIAGNOSIS — E10.40 TYPE 1 DIABETES, UNCONTROLLED, WITH NEUROPATHY (HCC): ICD-10-CM

## 2018-02-22 DIAGNOSIS — I10 HYPERTENSION, ESSENTIAL, BENIGN: ICD-10-CM

## 2018-02-22 DIAGNOSIS — Z79.4 LONG-TERM INSULIN USE (HCC): ICD-10-CM

## 2018-02-22 DIAGNOSIS — E78.5 HYPERLIPIDEMIA LDL GOAL <100: ICD-10-CM

## 2018-02-22 DIAGNOSIS — E10.65 TYPE 1 DIABETES, UNCONTROLLED, WITH NEUROPATHY (HCC): ICD-10-CM

## 2018-02-22 LAB
ALBUMIN SERPL-MCNC: 3.7 G/DL (ref 3.5–4.8)
ALP LIVER SERPL-CCNC: 104 U/L (ref 55–142)
ALT SERPL-CCNC: 15 U/L (ref 14–54)
AST SERPL-CCNC: 15 U/L (ref 15–41)
BILIRUB SERPL-MCNC: 0.4 MG/DL (ref 0.1–2)
BUN BLD-MCNC: 16 MG/DL (ref 8–20)
CALCIUM BLD-MCNC: 9 MG/DL (ref 8.3–10.3)
CHLORIDE: 101 MMOL/L (ref 101–111)
CHOLEST SMN-MCNC: 151 MG/DL (ref ?–200)
CO2: 31 MMOL/L (ref 22–32)
CREAT BLD-MCNC: 0.84 MG/DL (ref 0.55–1.02)
CREAT UR-SCNC: 107 MG/DL
EST. AVERAGE GLUCOSE BLD GHB EST-MCNC: 143 MG/DL (ref 68–126)
GLUCOSE BLD-MCNC: 102 MG/DL (ref 70–99)
HBA1C MFR BLD HPLC: 6.6 % (ref ?–5.7)
HDLC SERPL-MCNC: 66 MG/DL (ref 45–?)
HDLC SERPL: 2.29 {RATIO} (ref ?–4.44)
LDLC SERPL CALC-MCNC: 69 MG/DL (ref ?–130)
M PROTEIN MFR SERPL ELPH: 7 G/DL (ref 6.1–8.3)
MICROALBUMIN UR-MCNC: 0.82 MG/DL
MICROALBUMIN/CREAT 24H UR-RTO: 7.7 UG/MG (ref ?–30)
NONHDLC SERPL-MCNC: 85 MG/DL (ref ?–130)
POTASSIUM SERPL-SCNC: 3.9 MMOL/L (ref 3.6–5.1)
SODIUM SERPL-SCNC: 139 MMOL/L (ref 136–144)
TRIGL SERPL-MCNC: 82 MG/DL (ref ?–150)
VLDLC SERPL CALC-MCNC: 16 MG/DL (ref 5–40)

## 2018-02-22 PROCEDURE — 82043 UR ALBUMIN QUANTITATIVE: CPT

## 2018-02-22 PROCEDURE — 80053 COMPREHEN METABOLIC PANEL: CPT

## 2018-02-22 PROCEDURE — 82570 ASSAY OF URINE CREATININE: CPT

## 2018-02-22 PROCEDURE — 36415 COLL VENOUS BLD VENIPUNCTURE: CPT

## 2018-02-22 PROCEDURE — 83036 HEMOGLOBIN GLYCOSYLATED A1C: CPT

## 2018-02-22 PROCEDURE — 80061 LIPID PANEL: CPT

## 2018-04-01 NOTE — PROGRESS NOTES
(THE BELOW MESSAGE IS BEING RELAYED BY THE RESULT MYCHART NOTE). Dear Lilly Ray,    I know you already saw these results. All of your tests (cholesterol, liver, kidney, urine protein and thyroid) all came back fine.     You don't have to write back unles

## 2018-06-26 ENCOUNTER — OFFICE VISIT (OUTPATIENT)
Dept: RHEUMATOLOGY | Facility: CLINIC | Age: 78
End: 2018-06-26

## 2018-06-26 VITALS
HEART RATE: 64 BPM | RESPIRATION RATE: 18 BRPM | SYSTOLIC BLOOD PRESSURE: 124 MMHG | DIASTOLIC BLOOD PRESSURE: 90 MMHG | HEIGHT: 61 IN | WEIGHT: 143 LBS | BODY MASS INDEX: 27 KG/M2

## 2018-06-26 DIAGNOSIS — M47.816 SPONDYLOSIS OF LUMBAR REGION WITHOUT MYELOPATHY OR RADICULOPATHY: ICD-10-CM

## 2018-06-26 DIAGNOSIS — M79.7 FIBROMYALGIA: Chronic | ICD-10-CM

## 2018-06-26 DIAGNOSIS — M35.01 SJOGREN'S SYNDROME WITH KERATOCONJUNCTIVITIS SICCA (HCC): Primary | ICD-10-CM

## 2018-06-26 DIAGNOSIS — M81.0 AGE-RELATED OSTEOPOROSIS WITHOUT CURRENT PATHOLOGICAL FRACTURE: ICD-10-CM

## 2018-06-26 PROCEDURE — 99213 OFFICE O/P EST LOW 20 MIN: CPT | Performed by: INTERNAL MEDICINE

## 2018-06-26 RX ORDER — HYDROCODONE BITARTRATE AND ACETAMINOPHEN 10; 325 MG/1; MG/1
1 TABLET ORAL EVERY 6 HOURS PRN
Qty: 120 TABLET | Refills: 0 | Status: SHIPPED | OUTPATIENT
Start: 2018-08-25 | End: 2018-08-10

## 2018-06-26 RX ORDER — HYDROCODONE BITARTRATE AND ACETAMINOPHEN 10; 325 MG/1; MG/1
1 TABLET ORAL EVERY 6 HOURS PRN
Qty: 120 TABLET | Refills: 0 | Status: SHIPPED | OUTPATIENT
Start: 2018-07-26 | End: 2018-08-25

## 2018-06-26 RX ORDER — ACETAMINOPHEN AND CODEINE PHOSPHATE 300; 30 MG/1; MG/1
1 TABLET ORAL
Refills: 0 | COMMUNITY
Start: 2018-06-12 | End: 2018-08-10 | Stop reason: ALTCHOICE

## 2018-06-26 RX ORDER — HYDROCODONE BITARTRATE AND ACETAMINOPHEN 10; 325 MG/1; MG/1
1 TABLET ORAL EVERY 6 HOURS PRN
Qty: 120 TABLET | Refills: 0 | Status: SHIPPED | OUTPATIENT
Start: 2018-06-26 | End: 2018-07-26

## 2018-06-26 NOTE — PROGRESS NOTES
[facility]  Dr. Cortez Bonilla Progress Note     Subjective:   Noam Ellis is a(n) 65year old female.    Current complaints: Patient presents with:  Fibromyalgia Syndrome: 6 mo f/u- feeling better, c/o bilateral shoulder pain, taking hydrocodeine once a

## 2018-06-26 NOTE — PATIENT INSTRUCTIONS
For pain of arthritis and fibromyalgia use Norco 10 mg one tablet twice a day as needed. ES Tylenol can also be used occasionally. Exercise regularly by walking. I will call you with the bone density results. Return to office 6 months.

## 2018-07-09 ENCOUNTER — HOSPITAL ENCOUNTER (OUTPATIENT)
Dept: BONE DENSITY | Age: 78
Discharge: HOME | End: 2018-07-09
Attending: INTERNAL MEDICINE
Payer: MEDICARE

## 2018-07-09 DIAGNOSIS — M81.0 AGE-RELATED OSTEOPOROSIS WITHOUT CURRENT PATHOLOGICAL FRACTURE: ICD-10-CM

## 2018-07-09 DIAGNOSIS — M35.01 SJOGREN'S SYNDROME WITH KERATOCONJUNCTIVITIS SICCA (HCC): ICD-10-CM

## 2018-07-09 DIAGNOSIS — M47.816 SPONDYLOSIS OF LUMBAR REGION WITHOUT MYELOPATHY OR RADICULOPATHY: ICD-10-CM

## 2018-07-09 PROCEDURE — 77080 DXA BONE DENSITY AXIAL: CPT | Performed by: INTERNAL MEDICINE

## 2018-07-21 DIAGNOSIS — E11.65 TYPE 2 DIABETES, UNCONTROLLED, WITH NEUROPATHY (HCC): ICD-10-CM

## 2018-07-21 DIAGNOSIS — E11.40 TYPE 2 DIABETES, UNCONTROLLED, WITH NEUROPATHY (HCC): ICD-10-CM

## 2018-07-23 RX ORDER — LIRAGLUTIDE 6 MG/ML
INJECTION SUBCUTANEOUS
Qty: 9 ML | Refills: 4 | Status: SHIPPED | OUTPATIENT
Start: 2018-07-23 | End: 2018-08-16 | Stop reason: ALTCHOICE

## 2018-08-10 ENCOUNTER — OFFICE VISIT (OUTPATIENT)
Dept: INTERNAL MEDICINE CLINIC | Facility: CLINIC | Age: 78
End: 2018-08-10
Payer: MEDICARE

## 2018-08-10 ENCOUNTER — TELEPHONE (OUTPATIENT)
Dept: INTERNAL MEDICINE CLINIC | Facility: CLINIC | Age: 78
End: 2018-08-10

## 2018-08-10 VITALS
TEMPERATURE: 99 F | HEART RATE: 72 BPM | HEIGHT: 61 IN | RESPIRATION RATE: 16 BRPM | BODY MASS INDEX: 25.86 KG/M2 | SYSTOLIC BLOOD PRESSURE: 136 MMHG | DIASTOLIC BLOOD PRESSURE: 74 MMHG | WEIGHT: 137 LBS

## 2018-08-10 DIAGNOSIS — N30.01 ACUTE CYSTITIS WITH HEMATURIA: Primary | ICD-10-CM

## 2018-08-10 LAB
APPEARANCE: CLEAR
BILIRUBIN: NEGATIVE
GLUCOSE (URINE DIPSTICK): NEGATIVE MG/DL
KETONES (URINE DIPSTICK): NEGATIVE MG/DL
MULTISTIX LOT#: ABNORMAL NUMERIC
NITRITE, URINE: NEGATIVE
PH, URINE: 7 (ref 4.5–8)
PROTEIN (URINE DIPSTICK): 30 MG/DL
SPECIFIC GRAVITY: 1.01 (ref 1–1.03)
URINE-COLOR: YELLOW
UROBILINOGEN,SEMI-QN: 0.2 MG/DL (ref 0–1.9)

## 2018-08-10 PROCEDURE — 87086 URINE CULTURE/COLONY COUNT: CPT | Performed by: NURSE PRACTITIONER

## 2018-08-10 PROCEDURE — 87186 SC STD MICRODIL/AGAR DIL: CPT | Performed by: NURSE PRACTITIONER

## 2018-08-10 PROCEDURE — 99213 OFFICE O/P EST LOW 20 MIN: CPT | Performed by: NURSE PRACTITIONER

## 2018-08-10 PROCEDURE — 81003 URINALYSIS AUTO W/O SCOPE: CPT | Performed by: NURSE PRACTITIONER

## 2018-08-10 PROCEDURE — 87088 URINE BACTERIA CULTURE: CPT | Performed by: NURSE PRACTITIONER

## 2018-08-10 RX ORDER — NITROFURANTOIN 25; 75 MG/1; MG/1
100 CAPSULE ORAL 2 TIMES DAILY
Qty: 10 CAPSULE | Refills: 0 | Status: SHIPPED | OUTPATIENT
Start: 2018-08-10 | End: 2018-08-16 | Stop reason: ALTCHOICE

## 2018-08-10 NOTE — TELEPHONE ENCOUNTER
Spoke with patient and recommended she come for evaluation of symptoms and UA. She agreed with plan. Patient has been scheduled for today.

## 2018-08-10 NOTE — PROGRESS NOTES
HPI:    Patient ID: Linda Valiente is a 65year old female. Patient presents with:  UTI: c/o urinary urgency and low back pain/ ache that began yesterday and today; Rm 1      UTI   This is a new problem. The current episode started yesterday.  The prob joints    • Pain with bowel movements    • Pain, upper back 5/1/2012    between shoulder blades   • Polyneuropathy in diabetes(357.2)    • PONV (postoperative nausea and vomiting)    • Postsurgical hypothyroidism     In 1984 - Total thyroidectomy for benig TONSILLECTOMY  No date: TOTAL KNEE REPLACEMENT      Comment: ROBERT  Family History   Problem Relation Age of Onset   • Diabetes Father      Type 2 DM   • Cancer Son      skin cancer/melanoma   • Diabetes Sister      One sister with Type 2 DM   • Breast Cance mouth every 6 (six) hours as needed for Pain.  Disp: 120 tablet Rfl: 0   RANITIDINE  MG Oral Tab TAKE ONE TABLET BY MOUTH AT BEDTIME  Disp: 90 tablet Rfl: 0   BD PEN NEEDLE MINI U/F 31G X 5 MM Does not apply Misc USE 4 TIMES DAILY Disp: 400 each Rfl: Beclomethasone Dipropionate (QNASL) 80 MCG/ACT Nasal Aero Soln 2 sprays by Nasal route daily.  (Patient taking differently: 2 sprays by Nasal route daily as needed.) Disp: 8.7 g Rfl: 3   ONETOUCH DELICA LANCETS Does not apply Misc For insulin use 3 times MCV 93.9 02/25/2017   MCH 31.1 02/25/2017   MCHC 33.2 02/25/2017   RDW 12.7 02/25/2017   .0 02/25/2017   MPV 10.3 02/05/2013       Lab Results  Component Value Date   CHOLEST 151 02/22/2018   TRIG 82 02/22/2018   HDL 66 02/22/2018   LDL 69 02/22/2 office next week  Call our office with any questions or concerns      JAIR Marie

## 2018-08-11 ENCOUNTER — TELEPHONE (OUTPATIENT)
Dept: INTERNAL MEDICINE CLINIC | Facility: CLINIC | Age: 78
End: 2018-08-11

## 2018-08-11 DIAGNOSIS — F34.1 DYSTHYMIA: ICD-10-CM

## 2018-08-13 RX ORDER — FLUOXETINE HYDROCHLORIDE 20 MG/1
CAPSULE ORAL
Qty: 90 CAPSULE | Refills: 1 | Status: SHIPPED | OUTPATIENT
Start: 2018-08-13 | End: 2019-01-08

## 2018-08-16 ENCOUNTER — OFFICE VISIT (OUTPATIENT)
Dept: INTERNAL MEDICINE CLINIC | Facility: CLINIC | Age: 78
End: 2018-08-16
Payer: MEDICARE

## 2018-08-16 VITALS
WEIGHT: 137 LBS | SYSTOLIC BLOOD PRESSURE: 108 MMHG | HEIGHT: 61 IN | DIASTOLIC BLOOD PRESSURE: 68 MMHG | BODY MASS INDEX: 25.86 KG/M2 | RESPIRATION RATE: 16 BRPM | HEART RATE: 84 BPM | TEMPERATURE: 98 F

## 2018-08-16 DIAGNOSIS — G47.33 OSA ON CPAP: ICD-10-CM

## 2018-08-16 DIAGNOSIS — Z79.4 TYPE 2 DIABETES MELLITUS WITHOUT COMPLICATION, WITH LONG-TERM CURRENT USE OF INSULIN (HCC): ICD-10-CM

## 2018-08-16 DIAGNOSIS — E10.65 UNCONTROLLED TYPE 1 DIABETES MELLITUS WITH HYPERGLYCEMIA (HCC): ICD-10-CM

## 2018-08-16 DIAGNOSIS — F34.1 DYSTHYMIA: ICD-10-CM

## 2018-08-16 DIAGNOSIS — E10.40 TYPE 1 DIABETES, UNCONTROLLED, WITH NEUROPATHY (HCC): ICD-10-CM

## 2018-08-16 DIAGNOSIS — M81.0 AGE-RELATED OSTEOPOROSIS WITHOUT CURRENT PATHOLOGICAL FRACTURE: ICD-10-CM

## 2018-08-16 DIAGNOSIS — E11.9 TYPE 2 DIABETES MELLITUS WITHOUT COMPLICATION, WITH LONG-TERM CURRENT USE OF INSULIN (HCC): ICD-10-CM

## 2018-08-16 DIAGNOSIS — E10.65 TYPE 1 DIABETES, UNCONTROLLED, WITH NEUROPATHY (HCC): ICD-10-CM

## 2018-08-16 DIAGNOSIS — M35.01 SJOGREN'S SYNDROME WITH KERATOCONJUNCTIVITIS SICCA (HCC): ICD-10-CM

## 2018-08-16 DIAGNOSIS — F51.02 INSOMNIA DUE TO STRESS: ICD-10-CM

## 2018-08-16 DIAGNOSIS — E89.0 POSTSURGICAL HYPOTHYROIDISM: ICD-10-CM

## 2018-08-16 DIAGNOSIS — Z99.89 OSA ON CPAP: ICD-10-CM

## 2018-08-16 DIAGNOSIS — G43.909 MIGRAINE SYNDROME: ICD-10-CM

## 2018-08-16 DIAGNOSIS — M79.7 FIBROMYALGIA: Chronic | ICD-10-CM

## 2018-08-16 DIAGNOSIS — M47.816 SPONDYLOSIS OF LUMBAR REGION WITHOUT MYELOPATHY OR RADICULOPATHY: ICD-10-CM

## 2018-08-16 DIAGNOSIS — Z00.00 ENCOUNTER FOR ANNUAL HEALTH EXAMINATION: ICD-10-CM

## 2018-08-16 DIAGNOSIS — Z00.00 PHYSICAL EXAM, ANNUAL: Primary | ICD-10-CM

## 2018-08-16 PROBLEM — IMO0002 TYPE 1 DIABETES, UNCONTROLLED, WITH NEUROPATHY: Status: RESOLVED | Noted: 2017-11-20 | Resolved: 2018-08-16

## 2018-08-16 PROCEDURE — G0439 PPPS, SUBSEQ VISIT: HCPCS | Performed by: INTERNAL MEDICINE

## 2018-08-16 NOTE — PATIENT INSTRUCTIONS
Diet: Diabetes  Food is an important tool that you can use to control diabetes and stay healthy. Eating well-balanced meals in the correct amounts will help you control your blood glucose levels and prevent low blood sugar reactions.  It will also help yo · Avoid added salt. It can contribute to high blood pressure, which can cause heart disease. People with diabetes already have a risk of high blood pressure and heart disease. · Stay at a healthy weight.  If you need to lose weight, cut down on your portio 11/08/2010 166 (H)   ----------  GLUCOSE (mg/dL)   Date Value   05/02/2014 181 (H)   ---------- Medicare covers annually or at 6-month intervals for prediabetic patients        Cardiovascular Disease Screening     Cholesterol, covered every 5 yrs including OK to schedule if you are in this risk group, make sure you have a referral   Bone Density Screening      Bone density screening   Covered every 2 yrs after age 72    Covered yearly for Long term Glucocorticoid medication (Steroids) Requires diagnosis rel Hemophiliacs who received Factor VIII or IX concentrates   Clients of institutions for the mentally retarded   Persons who live in the same house as a HepB virus carrier   Homosexual men   Illicit injectable drug abusers     Tetanus Toxoid- Only covered wi

## 2018-08-16 NOTE — PROGRESS NOTES
HPI:   Isabel Link is a 65year old female who presents for a Medicare Subsequent Annual Wellness visit (Pt already had Initial Annual Wellness).     HPI:  Here for AWV  No new complaints  Would like to change to byetta for wt loss and control of dm2 (Psychiatry)  Juliocesar Hernandez DO (PULMONARY DISEASES)  Kg Puckett MD (ENDOCRINOLOGY)  Valerie Singh MD (DERMATOLOGY)    Patient Active Problem List:     Postsurgical hypothyroidism     Contrast media allergy     Fibromyalgia     Dysthymia     In Take as directed with meals  - max 30 Units per day   insulin glargine (LANTUS SOLOSTAR) 100 UNIT/ML Subcutaneous Solution Pen-injector Take 12 Units at bedtime   HYDROcodone-acetaminophen  MG Oral Tab Take 1 tablet by mouth every 6 (six) hours as ne tablet nightly.    VAGIFEM 10 MCG Vaginal Tab INSERT 1 TABLET (10 MCG) BY VAGINAL ROUTE TWICE WEEKLY (Patient taking differently: INSERT 1 TABLET (10 MCG) BY VAGINAL ROUTE three times wEEKLY)   VITAMIN D 2000 UNIT OR TABS 1 TABLET DAILY      MEDICAL INFORMA includes Breast Cancer (age of onset: 45) in her paternal cousin female; Breast Cancer (age of onset: 39) in her sister; Breast Cancer (age of onset: 64) in her sister; Cancer in her son; Diabetes in her brother, father, paternal grandfather, and sister; H Appearance:  Alert, cooperative, no distress, appears stated age   Head:  Normocephalic, without obvious abnormality, atraumatic   Eyes:  PERRL, conjunctiva/corneas clear, EOM's intact both eyes   Ears:  Normal TM's and external ear canals, both ears   Nos AND OTHER RELEVANT CHRONIC CONDITIONS:   Ana Brenner is a 65year old female who presents for a Medicare Assessment.      PLAN SUMMARY:   Diagnoses and all orders for this visit:    Physical exam, annual    Fibromyalgia    Postsurgical hypothyroidism these issues and agrees to the plan. Reinforced healthy diet, lifestyle, and exercise. Return in about 6 months (around 2/16/2019).      Nayeli Woods MD, 8/16/2018     General Health     In the past six months, have you lost more than 10 pounds without Scan:   XR DEXA BONE DENSITOMETRY (CPT=77080) 07/09/2018    No flowsheet data found.     Pap and Pelvic      Pap: Every 3 yrs age 21-68 or Pap+HPV every 5 yrs age 33-67, age 72 and older at high risk There are no preventive care reminders to display for [facility] 09/26/2012 4.4    No flowsheet data found.     Creatinine  Annually Creatinine, Serum (mg/dL)   Date Value   11/08/2010 1.00     CREATININE (mg/dL)   Date Value   05/02/2014 0.61     Creatinine (mg/dL)   Date Value   02/22/2018 0.84    No flowsheet data f

## 2018-08-20 ENCOUNTER — TELEPHONE (OUTPATIENT)
Dept: INTERNAL MEDICINE CLINIC | Facility: CLINIC | Age: 78
End: 2018-08-20

## 2018-08-20 DIAGNOSIS — N30.01 ACUTE CYSTITIS WITH HEMATURIA: ICD-10-CM

## 2018-08-20 RX ORDER — NITROFURANTOIN 25; 75 MG/1; MG/1
100 CAPSULE ORAL 2 TIMES DAILY
Qty: 20 CAPSULE | Refills: 0 | Status: SHIPPED | OUTPATIENT
Start: 2018-08-20 | End: 2018-08-27

## 2018-08-20 NOTE — TELEPHONE ENCOUNTER
Patient left a voicemail saying her bladder infection symptoms have come back - please call to let her know if she should go back on medication or if she needs to be seen.

## 2018-08-20 NOTE — TELEPHONE ENCOUNTER
Giovanna Dos Santos recommends repeating Macrobid x10 days and follow up should symptoms persist.   Rx sent to [facility] and patient aware.  She verbalized understanding and agreed with plan

## 2018-08-23 ENCOUNTER — LAB ENCOUNTER (OUTPATIENT)
Dept: LAB | Age: 78
End: 2018-08-23
Attending: INTERNAL MEDICINE
Payer: MEDICARE

## 2018-08-23 DIAGNOSIS — L65.9 HAIR LOSS: Primary | ICD-10-CM

## 2018-08-23 PROBLEM — R79.89 LOW SERUM C-PEPTIDE: Status: ACTIVE | Noted: 2018-08-23

## 2018-08-23 LAB — TSI SER-ACNC: 0.45 MIU/ML (ref 0.35–5.5)

## 2018-08-23 PROCEDURE — 36415 COLL VENOUS BLD VENIPUNCTURE: CPT

## 2018-08-23 PROCEDURE — 84402 ASSAY OF FREE TESTOSTERONE: CPT

## 2018-08-23 PROCEDURE — 84403 ASSAY OF TOTAL TESTOSTERONE: CPT

## 2018-08-23 PROCEDURE — 84443 ASSAY THYROID STIM HORMONE: CPT

## 2018-08-23 PROCEDURE — 82627 DEHYDROEPIANDROSTERONE: CPT

## 2018-08-27 ENCOUNTER — OFFICE VISIT (OUTPATIENT)
Dept: INTERNAL MEDICINE CLINIC | Facility: CLINIC | Age: 78
End: 2018-08-27
Payer: MEDICARE

## 2018-08-27 VITALS
DIASTOLIC BLOOD PRESSURE: 74 MMHG | BODY MASS INDEX: 26.24 KG/M2 | HEART RATE: 84 BPM | WEIGHT: 139 LBS | TEMPERATURE: 98 F | SYSTOLIC BLOOD PRESSURE: 124 MMHG | HEIGHT: 61 IN | RESPIRATION RATE: 16 BRPM

## 2018-08-27 DIAGNOSIS — N30.00 ACUTE CYSTITIS WITHOUT HEMATURIA: Primary | ICD-10-CM

## 2018-08-27 LAB
APPEARANCE: CLEAR
BILIRUBIN: NEGATIVE
DEHYDROEPIANDROSTERONE BY TMS: 0.36 NG/ML
GLUCOSE (URINE DIPSTICK): NEGATIVE MG/DL
KETONES (URINE DIPSTICK): NEGATIVE MG/DL
LEUKOCYTES: NEGATIVE
MULTISTIX LOT#: NORMAL NUMERIC
NITRITE, URINE: NEGATIVE
OCCULT BLOOD: NEGATIVE
PH, URINE: 7 (ref 4.5–8)
PROTEIN (URINE DIPSTICK): NEGATIVE MG/DL
SEX HORMONE BINDING GLOBULIN: 84 NMOL/L
SPECIFIC GRAVITY: 1.01 (ref 1–1.03)
TESTOSTERONE -MS, BIOAVAILAB: 1.4 NG/DL
TESTOSTERONE, -MS/MS: 6 NG/DL
TESTOSTERONE, FREE -MS/MS: 0.6 PG/ML
URINE-COLOR: YELLOW
UROBILINOGEN,SEMI-QN: 0.2 MG/DL (ref 0–1.9)

## 2018-08-27 PROCEDURE — 87086 URINE CULTURE/COLONY COUNT: CPT | Performed by: NURSE PRACTITIONER

## 2018-08-27 PROCEDURE — 81003 URINALYSIS AUTO W/O SCOPE: CPT | Performed by: NURSE PRACTITIONER

## 2018-08-27 PROCEDURE — 99213 OFFICE O/P EST LOW 20 MIN: CPT | Performed by: NURSE PRACTITIONER

## 2018-08-27 NOTE — PROGRESS NOTES
HPI:    Patient ID: Sonam Nicole is a 65year old female. Patient presents with:  UTI: reports 3 more days of the macrobid but still has flank pain, frequency and pelvic/bladder discomfort; Rm 1    UTI   This is a recurrent problem.  The current epis movements    • Pain, upper back 5/1/2012    between shoulder blades   • Polyneuropathy in diabetes(357.2)    • PONV (postoperative nausea and vomiting)    • Postsurgical hypothyroidism     In 1984 - Total thyroidectomy for benign thyroid disease   • Presen REPLACEMENT      Comment: ROBERT  Family History   Problem Relation Age of Onset   • Diabetes Father      Type 2 DM   • Cancer Son      skin cancer/melanoma   • Diabetes Sister      One sister with Type 2 DM   • Breast Cancer Sister 39      at 52   • [de-identified] Disp: 10 pen Rfl: 1   insulin glargine (LANTUS SOLOSTAR) 100 UNIT/ML Subcutaneous Solution Pen-injector Take 12 Units at bedtime Disp: 5 pen Rfl: 1   RANITIDINE  MG Oral Tab TAKE ONE TABLET BY MOUTH AT BEDTIME  Disp: 90 tablet Rfl: 0   ATORVASTATIN tablet nightly.  Disp:  Rfl:    VAGIFEM 10 MCG Vaginal Tab INSERT 1 TABLET (10 MCG) BY VAGINAL ROUTE TWICE WEEKLY (Patient taking differently: INSERT 1 TABLET (10 MCG) BY VAGINAL ROUTE three times wEEKLY) Disp: 18 Tab Rfl: 0   VITAMIN D 2000 UNIT OR TABS 1 TCHDLRATIO 2.29 02/22/2018   [place] 85 02/22/2018       Lab Results  Component Value Date    (H) 02/22/2018   A1C 6.8 (A) 08/23/2018       Lab Results  Component Value Date   T4F 1.1 02/25/2017   TSH 0.448 08/23/2018       Lab Results  Component

## 2018-08-27 NOTE — PROGRESS NOTES
(THE BELOW MESSAGE IS BEING RELAYED BY THE RESULT MYCHART NOTE). Dear Jennifer Robles,    I know you already saw these results. Your thyroid test came back normal.    Please stay on the levothyroxine as you are already doing. You don't have to write back.

## 2018-09-01 ENCOUNTER — HOSPITAL ENCOUNTER (OUTPATIENT)
Dept: ULTRASOUND IMAGING | Age: 78
Discharge: HOME | End: 2018-09-01
Attending: NURSE PRACTITIONER
Payer: MEDICARE

## 2018-09-01 DIAGNOSIS — N30.00 ACUTE CYSTITIS WITHOUT HEMATURIA: ICD-10-CM

## 2018-09-01 PROCEDURE — 76770 US EXAM ABDO BACK WALL COMP: CPT | Performed by: NURSE PRACTITIONER

## 2018-09-02 NOTE — PROGRESS NOTES
(THE BELOW MESSAGE IS BEING RELAYED BY THE RESULT MYCHART NOTE). Dear Eris Goodrich,    Your male hormone tests came back mildly low but that is okay. We were concerned that the levels would be high, not low.      There is nothing to do for low male hormones in

## 2018-09-04 ENCOUNTER — TELEPHONE (OUTPATIENT)
Dept: INTERNAL MEDICINE CLINIC | Facility: CLINIC | Age: 78
End: 2018-09-04

## 2018-09-05 NOTE — TELEPHONE ENCOUNTER
I spoke with patient regarding US results. Instructions provided for pre- CT medications for allergy to contrast. I also provided central scheduling number to call and schedule testing.  She will call the office with any questions or concerns

## 2018-09-08 DIAGNOSIS — F34.1 DYSTHYMIA: ICD-10-CM

## 2018-09-10 ENCOUNTER — TELEPHONE (OUTPATIENT)
Dept: INTERNAL MEDICINE CLINIC | Facility: CLINIC | Age: 78
End: 2018-09-10

## 2018-09-10 ENCOUNTER — HOSPITAL ENCOUNTER (OUTPATIENT)
Dept: CT IMAGING | Age: 78
Discharge: HOME | End: 2018-09-10
Attending: NURSE PRACTITIONER
Payer: MEDICARE

## 2018-09-10 DIAGNOSIS — N28.89 RENAL MASS, LEFT: ICD-10-CM

## 2018-09-10 LAB — CREAT SERPL-MCNC: 1 MG/DL (ref 0.55–1.02)

## 2018-09-10 PROCEDURE — 74178 CT ABD&PLV WO CNTR FLWD CNTR: CPT | Performed by: NURSE PRACTITIONER

## 2018-09-10 PROCEDURE — 82565 ASSAY OF CREATININE: CPT

## 2018-09-10 RX ORDER — FLUOXETINE HYDROCHLORIDE 20 MG/1
CAPSULE ORAL
Qty: 90 CAPSULE | Refills: 0 | OUTPATIENT
Start: 2018-09-10

## 2018-09-15 ENCOUNTER — OFFICE VISIT (OUTPATIENT)
Dept: FAMILY MEDICINE CLINIC | Facility: CLINIC | Age: 78
End: 2018-09-15
Payer: MEDICARE

## 2018-09-15 VITALS
OXYGEN SATURATION: 98 % | DIASTOLIC BLOOD PRESSURE: 60 MMHG | RESPIRATION RATE: 18 BRPM | TEMPERATURE: 99 F | HEIGHT: 60 IN | BODY MASS INDEX: 26.9 KG/M2 | HEART RATE: 80 BPM | SYSTOLIC BLOOD PRESSURE: 104 MMHG | WEIGHT: 137 LBS

## 2018-09-15 DIAGNOSIS — R30.0 DYSURIA: Primary | ICD-10-CM

## 2018-09-15 DIAGNOSIS — N30.01 ACUTE CYSTITIS WITH HEMATURIA: ICD-10-CM

## 2018-09-15 LAB
MULTISTIX EXPIRATION DATE: ABNORMAL DATE
MULTISTIX LOT#: ABNORMAL NUMERIC
PH, URINE: 6 (ref 4.5–8)
SPECIFIC GRAVITY: 1.02 (ref 1–1.03)
URINE-COLOR: YELLOW
UROBILINOGEN,SEMI-QN: 1 MG/DL (ref 0–1.9)

## 2018-09-15 PROCEDURE — 81003 URINALYSIS AUTO W/O SCOPE: CPT | Performed by: NURSE PRACTITIONER

## 2018-09-15 PROCEDURE — 99213 OFFICE O/P EST LOW 20 MIN: CPT | Performed by: NURSE PRACTITIONER

## 2018-09-15 PROCEDURE — 87086 URINE CULTURE/COLONY COUNT: CPT | Performed by: NURSE PRACTITIONER

## 2018-09-15 PROCEDURE — 87088 URINE BACTERIA CULTURE: CPT | Performed by: NURSE PRACTITIONER

## 2018-09-15 PROCEDURE — 87186 SC STD MICRODIL/AGAR DIL: CPT | Performed by: NURSE PRACTITIONER

## 2018-09-15 RX ORDER — CIPROFLOXACIN 250 MG/1
500 TABLET, FILM COATED ORAL 2 TIMES DAILY
Qty: 28 TABLET | Refills: 0 | Status: SHIPPED | OUTPATIENT
Start: 2018-09-15 | End: 2018-09-18

## 2018-09-15 NOTE — PROGRESS NOTES
Josie Morales is a 65year old female. HPI:   Patient presents with symptoms of UTI for 6 days. Complaining of urinary frequency, urgency, dysuria, pos suprapubic pain   Denies back pain, fever, hematuria.   Pt has strong history of UTI in past.  Pt h XL, 300 MG Oral Tablet 24 Hr TAKE ONE TABLET BY MOUTH ONE TIME DAILY  Disp: 90 tablet Rfl: 1   METOPROLOL SUCCINATE ER 25 MG Oral Tablet 24 Hr TAKE ONE TABLET BY MOUTH ONE TIME DAILY  Disp: 90 tablet Rfl: 2   Biotin 5000 MCG Oral Cap  Disp:  Rfl:    Roberto SF 1.1 % Dental Gel  Disp:  Rfl: 5     No current facility-administered medications for this visit.     Past Medical History:   Diagnosis Date   • Abdominal pain    • Acute infective pharyngitis 4/11/2012   • Acute sinusitis 12/12/2011   • Anesthesia comp Used    Alcohol use: Yes      Comment: occasionally    Drug use: No        REVIEW OF SYSTEMS:   GENERAL HEALTH: no  fever/chills or fatigue  SKIN: denies any unusual skin lesions or rashes  RESPIRATORY: no shortness of breath with exertion  CARDIOVASCULAR: understanding of these issues and agrees to the plan.

## 2018-09-18 ENCOUNTER — TELEPHONE (OUTPATIENT)
Dept: INTERNAL MEDICINE CLINIC | Facility: CLINIC | Age: 78
End: 2018-09-18

## 2018-09-18 DIAGNOSIS — N30.00 ACUTE CYSTITIS WITHOUT HEMATURIA: Primary | ICD-10-CM

## 2018-09-18 RX ORDER — CEPHALEXIN 500 MG/1
500 CAPSULE ORAL 3 TIMES DAILY
Qty: 21 CAPSULE | Refills: 0 | Status: SHIPPED | OUTPATIENT
Start: 2018-09-18 | End: 2018-10-02

## 2018-09-18 NOTE — TELEPHONE ENCOUNTER
Patient was seen for a bladder infection, and she was prescribed Cipro. She has taken Cipro for 3.5 days (Since Saturday), and her condition is not improving. Please advise patient.

## 2018-09-18 NOTE — TELEPHONE ENCOUNTER
Spoke with pt still having urinary urgency despite taking cipro for 3 days. Per Dr. Italia Olmos:  Stop cipro and begin  Keflex 500mg TID x 7 days. D/w pt above treatment plan she expressed understanding. Rx sent.

## 2018-10-01 DIAGNOSIS — F34.1 DYSTHYMIA: ICD-10-CM

## 2018-10-02 ENCOUNTER — TELEPHONE (OUTPATIENT)
Dept: INTERNAL MEDICINE CLINIC | Facility: CLINIC | Age: 78
End: 2018-10-02

## 2018-10-02 DIAGNOSIS — N30.00 ACUTE CYSTITIS WITHOUT HEMATURIA: ICD-10-CM

## 2018-10-02 RX ORDER — CEPHALEXIN 500 MG/1
500 CAPSULE ORAL 3 TIMES DAILY
Qty: 21 CAPSULE | Refills: 0 | Status: SHIPPED | OUTPATIENT
Start: 2018-10-02 | End: 2018-10-09

## 2018-10-02 RX ORDER — BUPROPION HYDROCHLORIDE 300 MG/1
TABLET ORAL
Qty: 90 TABLET | Refills: 1 | Status: SHIPPED | OUTPATIENT
Start: 2018-10-02 | End: 2019-07-15

## 2018-10-02 NOTE — TELEPHONE ENCOUNTER
Patient is going to [place] and would like to get a script to take with her on vacation in case she would get a UTI. Please call patient back after 3:00. Patient will be leaving next Tuesday.

## 2018-10-02 NOTE — TELEPHONE ENCOUNTER
Per Dr. Santana Cabot, Rx sent for Keflex to the pharmacy on file. [facility]

## 2018-10-03 ENCOUNTER — MED REC SCAN ONLY (OUTPATIENT)
Dept: INTERNAL MEDICINE CLINIC | Facility: CLINIC | Age: 78
End: 2018-10-03

## 2018-11-05 DIAGNOSIS — E10.40 TYPE 1 DIABETES, UNCONTROLLED, WITH NEUROPATHY (HCC): ICD-10-CM

## 2018-11-05 DIAGNOSIS — E10.65 UNCONTROLLED TYPE 1 DIABETES MELLITUS WITH HYPERGLYCEMIA (HCC): ICD-10-CM

## 2018-11-05 DIAGNOSIS — E10.65 TYPE 1 DIABETES, UNCONTROLLED, WITH NEUROPATHY (HCC): ICD-10-CM

## 2018-11-15 DIAGNOSIS — E78.5 HYPERLIPIDEMIA LDL GOAL <100: ICD-10-CM

## 2018-11-16 RX ORDER — ATORVASTATIN CALCIUM 40 MG/1
TABLET, FILM COATED ORAL
Qty: 90 TABLET | Refills: 0 | Status: SHIPPED | OUTPATIENT
Start: 2018-11-16 | End: 2019-03-31

## 2018-11-28 ENCOUNTER — PATIENT OUTREACH (OUTPATIENT)
Dept: INTERNAL MEDICINE CLINIC | Facility: CLINIC | Age: 78
End: 2018-11-28

## 2018-12-10 ENCOUNTER — OFFICE VISIT (OUTPATIENT)
Dept: RHEUMATOLOGY | Facility: CLINIC | Age: 78
End: 2018-12-10
Payer: MEDICARE

## 2018-12-10 VITALS
SYSTOLIC BLOOD PRESSURE: 148 MMHG | HEART RATE: 72 BPM | RESPIRATION RATE: 16 BRPM | WEIGHT: 142 LBS | BODY MASS INDEX: 28 KG/M2 | DIASTOLIC BLOOD PRESSURE: 94 MMHG

## 2018-12-10 DIAGNOSIS — M35.01 SJOGREN'S SYNDROME WITH KERATOCONJUNCTIVITIS SICCA (HCC): Primary | ICD-10-CM

## 2018-12-10 DIAGNOSIS — M79.7 FIBROMYALGIA: Chronic | ICD-10-CM

## 2018-12-10 PROCEDURE — 99213 OFFICE O/P EST LOW 20 MIN: CPT | Performed by: INTERNAL MEDICINE

## 2018-12-10 RX ORDER — PILOCARPINE HYDROCHLORIDE 7.5 MG/1
7.5 TABLET, FILM COATED ORAL NIGHTLY
Qty: 30 TABLET | Refills: 5 | Status: SHIPPED | OUTPATIENT
Start: 2018-12-10 | End: 2019-01-08

## 2018-12-10 RX ORDER — HYDROCODONE BITARTRATE AND ACETAMINOPHEN 10; 325 MG/1; MG/1
1 TABLET ORAL EVERY 6 HOURS PRN
Qty: 120 TABLET | Refills: 0 | Status: SHIPPED | OUTPATIENT
Start: 2019-01-09 | End: 2019-02-08

## 2018-12-10 RX ORDER — HYDROCODONE BITARTRATE AND ACETAMINOPHEN 10; 325 MG/1; MG/1
1 TABLET ORAL EVERY 6 HOURS PRN
Qty: 120 TABLET | Refills: 0 | Status: SHIPPED | OUTPATIENT
Start: 2018-12-10 | End: 2019-01-09

## 2018-12-10 RX ORDER — HYDROCODONE BITARTRATE AND ACETAMINOPHEN 10; 325 MG/1; MG/1
1 TABLET ORAL EVERY 6 HOURS PRN
Qty: 120 TABLET | Refills: 0 | Status: SHIPPED | OUTPATIENT
Start: 2019-02-08 | End: 2019-03-10

## 2018-12-10 NOTE — PROGRESS NOTES
[facility]  Dr. Nolan [place] Progress Note     Subjective:   Maia Barreto is a(n) 65year old female. Current complaints: Patient presents with:  Fibromyalgia Syndrome: 3 month f/u . Pt states 'is doing good.'   Refill Request: norco,   Feeling ok ove

## 2018-12-10 NOTE — PATIENT INSTRUCTIONS
Chronic dry mouth, continue to drink extra water, use over-the-counter Biotene products such as the mouthwash, gel, and lozenges.   We will also order pilocarpine G is a generic name for Salagen 1 tablet at night see if that will help your dry mouth at [facility]

## 2018-12-15 DIAGNOSIS — I10 HYPERTENSION, ESSENTIAL, BENIGN: ICD-10-CM

## 2018-12-17 RX ORDER — VERAPAMIL HYDROCHLORIDE 120 MG/1
TABLET, FILM COATED ORAL
Qty: 270 TABLET | Refills: 1 | Status: SHIPPED | OUTPATIENT
Start: 2018-12-17 | End: 2019-08-11

## 2018-12-23 DIAGNOSIS — I10 HYPERTENSION, ESSENTIAL, BENIGN: ICD-10-CM

## 2018-12-24 RX ORDER — IRBESARTAN 300 MG/1
TABLET ORAL
Qty: 90 TABLET | Refills: 0 | Status: SHIPPED | OUTPATIENT
Start: 2018-12-24 | End: 2019-04-03

## 2019-01-07 DIAGNOSIS — I10 HYPERTENSION, ESSENTIAL, BENIGN: ICD-10-CM

## 2019-01-08 ENCOUNTER — TELEPHONE (OUTPATIENT)
Dept: RHEUMATOLOGY | Facility: CLINIC | Age: 79
End: 2019-01-08

## 2019-01-08 DIAGNOSIS — F34.1 DYSTHYMIA: ICD-10-CM

## 2019-01-08 RX ORDER — FLUOXETINE HYDROCHLORIDE 20 MG/1
CAPSULE ORAL
Qty: 90 CAPSULE | Refills: 0 | Status: SHIPPED | OUTPATIENT
Start: 2019-01-08 | End: 2019-03-31

## 2019-01-08 RX ORDER — PILOCARPINE HYDROCHLORIDE 7.5 MG/1
7.5 TABLET, FILM COATED ORAL NIGHTLY
Qty: 90 TABLET | Refills: 0 | Status: SHIPPED | OUTPATIENT
Start: 2019-01-08 | End: 2019-04-08

## 2019-01-08 RX ORDER — METOPROLOL SUCCINATE 25 MG/1
TABLET, EXTENDED RELEASE ORAL
Qty: 90 TABLET | Refills: 1 | Status: SHIPPED | OUTPATIENT
Start: 2019-01-08 | End: 2019-07-14

## 2019-01-08 NOTE — TELEPHONE ENCOUNTER
LOV 12/10/18  Future Appointments   Date Time Provider Carmen Kenisha   3/1/2019  1:15 PM Cata Akbar MD [facility] [place]   3/15/2019  1:20 PM MD Regina Hughes [facility] [place]   4/11/2019  2:10 PM King Sacks, MD  ENT [place]

## 2019-01-10 ENCOUNTER — TELEPHONE (OUTPATIENT)
Dept: RHEUMATOLOGY | Facility: CLINIC | Age: 79
End: 2019-01-10

## 2019-02-22 ENCOUNTER — TELEPHONE (OUTPATIENT)
Dept: INTERNAL MEDICINE CLINIC | Facility: CLINIC | Age: 79
End: 2019-02-22

## 2019-02-22 DIAGNOSIS — E10.65 UNCONTROLLED TYPE 1 DIABETES MELLITUS WITH HYPERGLYCEMIA (HCC): ICD-10-CM

## 2019-02-22 DIAGNOSIS — Z79.4 LONG-TERM INSULIN USE (HCC): ICD-10-CM

## 2019-02-22 DIAGNOSIS — E78.5 HYPERLIPIDEMIA LDL GOAL <100: ICD-10-CM

## 2019-02-22 DIAGNOSIS — E10.40 TYPE 1 DIABETES, UNCONTROLLED, WITH NEUROPATHY (HCC): ICD-10-CM

## 2019-02-22 DIAGNOSIS — I10 HYPERTENSION, ESSENTIAL, BENIGN: ICD-10-CM

## 2019-02-22 DIAGNOSIS — M47.816 SPONDYLOSIS OF LUMBAR REGION WITHOUT MYELOPATHY OR RADICULOPATHY: ICD-10-CM

## 2019-02-22 DIAGNOSIS — E10.65 TYPE 1 DIABETES, UNCONTROLLED, WITH NEUROPATHY (HCC): ICD-10-CM

## 2019-02-22 DIAGNOSIS — E89.0 POSTSURGICAL HYPOTHYROIDISM: Primary | ICD-10-CM

## 2019-03-22 ENCOUNTER — LAB ENCOUNTER (OUTPATIENT)
Dept: LAB | Age: 79
End: 2019-03-22
Attending: INTERNAL MEDICINE
Payer: MEDICARE

## 2019-03-22 DIAGNOSIS — M47.816 SPONDYLOSIS OF LUMBAR REGION WITHOUT MYELOPATHY OR RADICULOPATHY: ICD-10-CM

## 2019-03-22 DIAGNOSIS — E10.40 TYPE 1 DIABETES, UNCONTROLLED, WITH NEUROPATHY (HCC): ICD-10-CM

## 2019-03-22 DIAGNOSIS — E89.0 POSTSURGICAL HYPOTHYROIDISM: ICD-10-CM

## 2019-03-22 DIAGNOSIS — Z79.4 LONG-TERM INSULIN USE (HCC): ICD-10-CM

## 2019-03-22 DIAGNOSIS — E10.65 TYPE 1 DIABETES, UNCONTROLLED, WITH NEUROPATHY (HCC): ICD-10-CM

## 2019-03-22 DIAGNOSIS — E78.5 HYPERLIPIDEMIA LDL GOAL <100: ICD-10-CM

## 2019-03-22 DIAGNOSIS — E10.65 UNCONTROLLED TYPE 1 DIABETES MELLITUS WITH HYPERGLYCEMIA (HCC): ICD-10-CM

## 2019-03-22 DIAGNOSIS — I10 HYPERTENSION, ESSENTIAL, BENIGN: ICD-10-CM

## 2019-03-22 LAB
ALBUMIN SERPL-MCNC: 3.7 G/DL (ref 3.4–5)
ALBUMIN/GLOB SERPL: 1.2 {RATIO} (ref 1–2)
ALP LIVER SERPL-CCNC: 104 U/L (ref 55–142)
ALT SERPL-CCNC: 19 U/L (ref 13–56)
ANION GAP SERPL CALC-SCNC: 6 MMOL/L (ref 0–18)
AST SERPL-CCNC: 16 U/L (ref 15–37)
BASOPHILS # BLD AUTO: 0.02 X10(3) UL (ref 0–0.2)
BASOPHILS NFR BLD AUTO: 0.4 %
BILIRUB SERPL-MCNC: 0.5 MG/DL (ref 0.1–2)
BILIRUB UR QL STRIP.AUTO: NEGATIVE
BUN BLD-MCNC: 16 MG/DL (ref 7–18)
BUN/CREAT SERPL: 21.3 (ref 10–20)
CALCIUM BLD-MCNC: 8.2 MG/DL (ref 8.5–10.1)
CHLORIDE SERPL-SCNC: 109 MMOL/L (ref 98–107)
CHOLEST SMN-MCNC: 146 MG/DL (ref ?–200)
CLARITY UR REFRACT.AUTO: CLEAR
CO2 SERPL-SCNC: 29 MMOL/L (ref 21–32)
CREAT BLD-MCNC: 0.75 MG/DL (ref 0.55–1.02)
CREAT UR-SCNC: 41.3 MG/DL
DEPRECATED RDW RBC AUTO: 47.2 FL (ref 35.1–46.3)
EOSINOPHIL # BLD AUTO: 0.14 X10(3) UL (ref 0–0.7)
EOSINOPHIL NFR BLD AUTO: 3.1 %
ERYTHROCYTE [DISTWIDTH] IN BLOOD BY AUTOMATED COUNT: 13.1 % (ref 11–15)
EST. AVERAGE GLUCOSE BLD GHB EST-MCNC: 194 MG/DL (ref 68–126)
GLOBULIN PLAS-MCNC: 3.1 G/DL (ref 2.8–4.4)
GLUCOSE BLD-MCNC: 112 MG/DL (ref 70–99)
GLUCOSE UR STRIP.AUTO-MCNC: NEGATIVE MG/DL
HBA1C MFR BLD HPLC: 8.4 % (ref ?–5.7)
HCT VFR BLD AUTO: 41.9 % (ref 35–48)
HDLC SERPL-MCNC: 72 MG/DL (ref 40–59)
HGB BLD-MCNC: 13.3 G/DL (ref 12–16)
IMM GRANULOCYTES # BLD AUTO: 0.03 X10(3) UL (ref 0–1)
IMM GRANULOCYTES NFR BLD: 0.7 %
KETONES UR STRIP.AUTO-MCNC: NEGATIVE MG/DL
LDLC SERPL CALC-MCNC: 64 MG/DL (ref ?–100)
LEUKOCYTE ESTERASE UR QL STRIP.AUTO: NEGATIVE
LYMPHOCYTES # BLD AUTO: 1.16 X10(3) UL (ref 1–4)
LYMPHOCYTES NFR BLD AUTO: 25.3 %
M PROTEIN MFR SERPL ELPH: 6.8 G/DL (ref 6.4–8.2)
MCH RBC QN AUTO: 30.6 PG (ref 26–34)
MCHC RBC AUTO-ENTMCNC: 31.7 G/DL (ref 31–37)
MCV RBC AUTO: 96.5 FL (ref 80–100)
MICROALBUMIN UR-MCNC: 0.74 MG/DL
MICROALBUMIN/CREAT 24H UR-RTO: 17.9 UG/MG (ref ?–30)
MONOCYTES # BLD AUTO: 0.36 X10(3) UL (ref 0.1–1)
MONOCYTES NFR BLD AUTO: 7.8 %
NEUTROPHILS # BLD AUTO: 2.88 X10 (3) UL (ref 1.5–7.7)
NEUTROPHILS # BLD AUTO: 2.88 X10(3) UL (ref 1.5–7.7)
NEUTROPHILS NFR BLD AUTO: 62.7 %
NITRITE UR QL STRIP.AUTO: NEGATIVE
NONHDLC SERPL-MCNC: 74 MG/DL (ref ?–130)
OSMOLALITY SERPL CALC.SUM OF ELEC: 300 MOSM/KG (ref 275–295)
PH UR STRIP.AUTO: 7 [PH] (ref 4.5–8)
PLATELET # BLD AUTO: 212 10(3)UL (ref 150–450)
POTASSIUM SERPL-SCNC: 3.8 MMOL/L (ref 3.5–5.1)
PROT UR STRIP.AUTO-MCNC: NEGATIVE MG/DL
RBC # BLD AUTO: 4.34 X10(6)UL (ref 3.8–5.3)
RBC UR QL AUTO: NEGATIVE
SODIUM SERPL-SCNC: 144 MMOL/L (ref 136–145)
SP GR UR STRIP.AUTO: 1.01 (ref 1–1.03)
TRIGL SERPL-MCNC: 52 MG/DL (ref 30–149)
TSI SER-ACNC: 0.7 MIU/ML (ref 0.36–3.74)
UROBILINOGEN UR STRIP.AUTO-MCNC: <2 MG/DL
VLDLC SERPL CALC-MCNC: 10 MG/DL (ref 0–30)
WBC # BLD AUTO: 4.6 X10(3) UL (ref 4–11)

## 2019-03-22 PROCEDURE — 82570 ASSAY OF URINE CREATININE: CPT

## 2019-03-22 PROCEDURE — 36415 COLL VENOUS BLD VENIPUNCTURE: CPT

## 2019-03-22 PROCEDURE — 85025 COMPLETE CBC W/AUTO DIFF WBC: CPT

## 2019-03-22 PROCEDURE — 80061 LIPID PANEL: CPT

## 2019-03-22 PROCEDURE — 81003 URINALYSIS AUTO W/O SCOPE: CPT

## 2019-03-22 PROCEDURE — 83036 HEMOGLOBIN GLYCOSYLATED A1C: CPT

## 2019-03-22 PROCEDURE — 84443 ASSAY THYROID STIM HORMONE: CPT

## 2019-03-22 PROCEDURE — 82043 UR ALBUMIN QUANTITATIVE: CPT

## 2019-03-22 PROCEDURE — 80053 COMPREHEN METABOLIC PANEL: CPT

## 2019-03-25 NOTE — PROGRESS NOTES
Discussed results and recommendatons with pt. Understanding expressed. She did see her Endo last week and medications were adjusted.

## 2019-03-31 DIAGNOSIS — F34.1 DYSTHYMIA: ICD-10-CM

## 2019-03-31 DIAGNOSIS — E78.5 HYPERLIPIDEMIA LDL GOAL <100: ICD-10-CM

## 2019-04-01 RX ORDER — ATORVASTATIN CALCIUM 40 MG/1
TABLET, FILM COATED ORAL
Qty: 90 TABLET | Refills: 0 | Status: SHIPPED | OUTPATIENT
Start: 2019-04-01 | End: 2019-07-14

## 2019-04-01 RX ORDER — FLUOXETINE HYDROCHLORIDE 20 MG/1
CAPSULE ORAL
Qty: 90 CAPSULE | Refills: 0 | Status: SHIPPED | OUTPATIENT
Start: 2019-04-01 | End: 2019-12-09

## 2019-04-01 RX ORDER — BUPROPION HYDROCHLORIDE 300 MG/1
TABLET ORAL
Qty: 90 TABLET | Refills: 0 | Status: SHIPPED | OUTPATIENT
Start: 2019-04-01 | End: 2019-04-15

## 2019-04-03 DIAGNOSIS — I10 HYPERTENSION, ESSENTIAL, BENIGN: ICD-10-CM

## 2019-04-04 RX ORDER — IRBESARTAN 300 MG/1
300 TABLET ORAL DAILY
Qty: 30 TABLET | Refills: 0 | Status: SHIPPED | OUTPATIENT
Start: 2019-04-04 | End: 2019-04-26

## 2019-04-15 DIAGNOSIS — J30.9 ALLERGIC RHINITIS: ICD-10-CM

## 2019-04-15 PROBLEM — R42 VERTIGO: Status: ACTIVE | Noted: 2017-10-19

## 2019-04-16 RX ORDER — BECLOMETHASONE DIPROPIONATE 80 UG/1
AEROSOL, METERED NASAL
Qty: 8.7 G | Refills: 2 | Status: SHIPPED | OUTPATIENT
Start: 2019-04-16 | End: 2019-04-17 | Stop reason: ALTCHOICE

## 2019-04-17 ENCOUNTER — OFFICE VISIT (OUTPATIENT)
Dept: INTERNAL MEDICINE CLINIC | Facility: CLINIC | Age: 79
End: 2019-04-17
Payer: MEDICARE

## 2019-04-17 VITALS
HEIGHT: 60 IN | DIASTOLIC BLOOD PRESSURE: 72 MMHG | RESPIRATION RATE: 16 BRPM | BODY MASS INDEX: 27.88 KG/M2 | WEIGHT: 142 LBS | TEMPERATURE: 98 F | HEART RATE: 77 BPM | SYSTOLIC BLOOD PRESSURE: 112 MMHG

## 2019-04-17 DIAGNOSIS — E10.65 TYPE 1 DIABETES, UNCONTROLLED, WITH NEUROPATHY (HCC): Primary | ICD-10-CM

## 2019-04-17 DIAGNOSIS — E78.5 MILD HYPERLIPIDEMIA: ICD-10-CM

## 2019-04-17 DIAGNOSIS — E10.40 TYPE 1 DIABETES, UNCONTROLLED, WITH NEUROPATHY (HCC): Primary | ICD-10-CM

## 2019-04-17 DIAGNOSIS — I10 ESSENTIAL HYPERTENSION: ICD-10-CM

## 2019-04-17 PROBLEM — R42 VERTIGO: Status: RESOLVED | Noted: 2017-10-19 | Resolved: 2019-04-17

## 2019-04-17 PROCEDURE — 99214 OFFICE O/P EST MOD 30 MIN: CPT | Performed by: INTERNAL MEDICINE

## 2019-04-18 NOTE — PROGRESS NOTES
HPI:    Patient ID: Abida Moses is a 77year old female. HPI  HPI:   Abida Moses is a 77year old female who presents for recheck of her diabetes type 1?, htn and hyperlipidemia.  Patient’s FBS have been have been low. + hypoglycemia with stephan Sodium 40 MG Oral Tab EC take 1 tablet by mouth two times daily before meals Disp: 180 tablet Rfl: 3   hyoscyamine sulfate 0.125 MG Oral Tablet Dispersible place 1-2 tablets under the tongue every 4-6 hours as needed Disp: 30 tablet Rfl: 3   Irbesartan 300 Chew Tab  Disp:  Rfl:    ONETOUCH ULTRA MINI w/Device Does not apply Kit Dispense 1 OneTouch Mini Meter Disp: 1 kit Rfl: 0   ONETOUCH ULTRA BLUE In Vitro Strip Check blood sugars 4 times per day Disp: 400 strip Rfl: 1   Glucose Blood (ONETOUCH ULTRA BLUE) movements    • Pain, upper back 5/1/2012    between shoulder blades   • Painful swallowing    • Polyneuropathy in diabetes(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  SCHEDULE AN APPOINTMENT FOR FURTHER REFILLS Disp: 30 tablet Rfl: 0   ATORVASTATIN 40 MG Oral Tab TAKE ONE TABLET BY MOUTH NIGHTLY  Disp: 90 tablet Rfl: 0   FLUOXETINE HCL 20 MG Oral Cap TAKE ONE CAPSULE Vitro Strip Check blood sugars 3 times per day.  Dx: Type 2 DM (E11.65) on insulin Disp: 300 strip Rfl: 1   SF 1.1 % Dental Gel  Disp:  Rfl: 5   ONETOUCH DELICA LANCETS Does not apply Misc For insulin use 3 times per day Disp: 300 each Rfl: 1   MELATONIN 1 REFERRAL PROVIDER VISIT-DIABETES       #[id number]

## 2019-04-23 ENCOUNTER — TELEPHONE (OUTPATIENT)
Dept: INTERNAL MEDICINE CLINIC | Facility: CLINIC | Age: 79
End: 2019-04-23

## 2019-04-23 NOTE — TELEPHONE ENCOUNTER
Patient called and requested to know if she needs to have the measles vaccination? She is hearing a lot about it. Please advise.

## 2019-04-23 NOTE — TELEPHONE ENCOUNTER
Per Dr. Chanelle Gomes, does not recommend revaccination of MMR if they have already been vaccinated. Patient aware of recommendations and verbalized understanding.

## 2019-04-25 ENCOUNTER — OFFICE VISIT (OUTPATIENT)
Dept: ENDOCRINOLOGY CLINIC | Facility: CLINIC | Age: 79
End: 2019-04-25
Payer: MEDICARE

## 2019-04-25 ENCOUNTER — TELEPHONE (OUTPATIENT)
Dept: ENDOCRINOLOGY CLINIC | Facility: CLINIC | Age: 79
End: 2019-04-25

## 2019-04-25 VITALS
SYSTOLIC BLOOD PRESSURE: 128 MMHG | TEMPERATURE: 98 F | RESPIRATION RATE: 20 BRPM | BODY MASS INDEX: 27.88 KG/M2 | HEIGHT: 60 IN | DIASTOLIC BLOOD PRESSURE: 80 MMHG | HEART RATE: 72 BPM | WEIGHT: 142 LBS

## 2019-04-25 DIAGNOSIS — I10 HYPERTENSION, ESSENTIAL, BENIGN: ICD-10-CM

## 2019-04-25 DIAGNOSIS — E10.65 TYPE 1 DIABETES, UNCONTROLLED, WITH NEUROPATHY (HCC): Primary | ICD-10-CM

## 2019-04-25 DIAGNOSIS — E10.40 TYPE 1 DIABETES, UNCONTROLLED, WITH NEUROPATHY (HCC): Primary | ICD-10-CM

## 2019-04-25 PROCEDURE — 99214 OFFICE O/P EST MOD 30 MIN: CPT | Performed by: NURSE PRACTITIONER

## 2019-04-25 PROCEDURE — 95251 CONT GLUC MNTR ANALYSIS I&R: CPT | Performed by: NURSE PRACTITIONER

## 2019-04-25 NOTE — ASSESSMENT & PLAN NOTE
A1C: 8.4% but having hypoglycemia  Low suspicioun type 1 DM due to drastic glucose lowering effect GLP1 had with patient  Discussed age an risk of hypoglycemia on her health; per ADA ,  it may be important for us to avoid hypoglycemia and slightly higher b

## 2019-04-25 NOTE — PROGRESS NOTES
Guzman Donis is a 77year old female who presents today to establish for diabetes management.    Primary care physician: Toan Muse MD   Her most recent A1C was 8.4% which reflects increase in BG averages in past 3m ~ but she has having more lows rec Lantus 22 units daily at HS   Novolog Flex touch: 1:15 IC ratio  Correction factor: + 2 units when above 150 for every 100 points   dexcom G5 2018 (not streaming)         HGBA1C:    Lab Results   Component Value Date    A1C 8.4 (H) 03/22/2019    A1C 8.3 (A migraines   • Hearing loss    • Hemorrhoids    • High cholesterol    • History of depression    • HOSPITALIZATIONS     work up for TIA vs. stroke and was dx with migraines   • Hypertension    • IBS (irritable bowel syndrome)    • Irregular bowel habits Packs/day: 1.00        Years: 8.00        Pack years: 8        Quit date: 1967        Years since quittin.9      Smokeless tobacco: Never Used    Alcohol use: Yes      Comment: occasionally    Drug use: No    Family History   Problem Relatio insulin glargine (LANTUS SOLOSTAR) 100 UNIT/ML Subcutaneous Solution Pen-injector INJECT 20 UNITS SUBCUTANEOUSLY AT BEDTIME Disp: 30 mL Rfl: 1   VERAPAMIL  MG Oral Tab TAKE ONE TABLET BY MOUTH THREE TIMES DAILY  Disp: 270 tablet Rfl: 1   Insulin Asp VAGIFEM 10 MCG Vaginal Tab INSERT 1 TABLET (10 MCG) BY VAGINAL ROUTE TWICE WEEKLY (Patient taking differently: INSERT 1 TABLET (10 MCG) BY VAGINAL ROUTE three times wEEKLY) Disp: 18 Tab Rfl: 0     Review of Systems   Constitutional: Negative for fatigue an Psychiatric: She has a normal mood and affect.  Her behavior is normal.       Assessment/Plan:  Type 1 diabetes, uncontrolled, with neuropathy (HCC)  A1C: 8.4% but having hypoglycemia  Low suspicioun type 1 DM due to drastic glucose lowering effect GLP1 had Liraglutide (VICTOZA) 18 MG/3ML Subcutaneous Solution Pen-injector          Sig: Inject 1.2 mg into the skin daily.           Dispense:  3 mL          Refill:  0          Order Comments: 2 sample given lot#[id number] exp-01/20      DM Quality Indicators:  A1

## 2019-04-25 NOTE — PATIENT INSTRUCTIONS
Your trends look good   Lets do labs today to see if you are producing insulin and have antibodies for type 1 Diabetes    I am suspicious that you are type 2 since the Byetta was decreasing your sugars so much         1.  Decrease Lantus 20 units once daily

## 2019-04-26 RX ORDER — IRBESARTAN 300 MG/1
TABLET ORAL
Qty: 90 TABLET | Refills: 1 | Status: SHIPPED | OUTPATIENT
Start: 2019-04-26 | End: 2019-10-14

## 2019-05-07 ENCOUNTER — APPOINTMENT (OUTPATIENT)
Dept: LAB | Age: 79
End: 2019-05-07
Attending: NURSE PRACTITIONER
Payer: MEDICARE

## 2019-05-07 ENCOUNTER — OFFICE VISIT (OUTPATIENT)
Dept: ENDOCRINOLOGY CLINIC | Facility: CLINIC | Age: 79
End: 2019-05-07
Payer: MEDICARE

## 2019-05-07 ENCOUNTER — TELEPHONE (OUTPATIENT)
Dept: INTERNAL MEDICINE CLINIC | Facility: CLINIC | Age: 79
End: 2019-05-07

## 2019-05-07 VITALS
BODY MASS INDEX: 27.68 KG/M2 | DIASTOLIC BLOOD PRESSURE: 70 MMHG | WEIGHT: 141 LBS | TEMPERATURE: 98 F | RESPIRATION RATE: 20 BRPM | HEART RATE: 74 BPM | SYSTOLIC BLOOD PRESSURE: 110 MMHG | HEIGHT: 60 IN

## 2019-05-07 DIAGNOSIS — Z79.4 LONG-TERM INSULIN USE (HCC): ICD-10-CM

## 2019-05-07 DIAGNOSIS — E10.40 TYPE 1 DIABETES, UNCONTROLLED, WITH NEUROPATHY (HCC): ICD-10-CM

## 2019-05-07 DIAGNOSIS — E10.65 TYPE 1 DIABETES, UNCONTROLLED, WITH NEUROPATHY (HCC): ICD-10-CM

## 2019-05-07 DIAGNOSIS — E11.65 UNCONTROLLED TYPE 2 DIABETES MELLITUS WITH HYPERGLYCEMIA, WITH LONG-TERM CURRENT USE OF INSULIN (HCC): ICD-10-CM

## 2019-05-07 DIAGNOSIS — Z79.4 UNCONTROLLED TYPE 2 DIABETES MELLITUS WITH HYPERGLYCEMIA, WITH LONG-TERM CURRENT USE OF INSULIN (HCC): ICD-10-CM

## 2019-05-07 PROCEDURE — 82962 GLUCOSE BLOOD TEST: CPT | Performed by: NURSE PRACTITIONER

## 2019-05-07 PROCEDURE — 83516 IMMUNOASSAY NONANTIBODY: CPT

## 2019-05-07 PROCEDURE — 99214 OFFICE O/P EST MOD 30 MIN: CPT | Performed by: NURSE PRACTITIONER

## 2019-05-07 PROCEDURE — 86341 ISLET CELL ANTIBODY: CPT

## 2019-05-07 PROCEDURE — 36415 COLL VENOUS BLD VENIPUNCTURE: CPT

## 2019-05-07 PROCEDURE — 84681 ASSAY OF C-PEPTIDE: CPT

## 2019-05-07 NOTE — PROGRESS NOTES
Sonam Nicole is a 77year old female presents today for   diabetes management. Primary care physician: Sofia Hernandez MD   In the past 2 weeks her BG trends have improved.   No issues w hypoglycemia    At last visit, RUFINO/Islet cell antibodies were orde MICROALBCREA 17.9 03/22/2019    CREATSERUM 0.75 03/22/2019    GFRNAA 76 03/22/2019    GFRAA 88 03/22/2019     DM associated symptoms:   Polyuria, polyphagia, polydipsia: no  Paresthesias: no  Blurred vision: no  Hypoglycemia: No     DM Complications:  Micr and vomiting)    • Postsurgical hypothyroidism     In 1984 - Total thyroidectomy for benign thyroid disease   • Presence of other cardiac implants and grafts     knees   • Skin abscess 5/1/2012   • Sleep apnea    • Sleep disturbance    • Traumatic ecchymos • Breast Cancer Paternal Cousin Female 45   • Breast Cancer Sister 64         at 70   • Heart Attack Daughter    • Stroke Daughter        Current Outpatient Medications:  Liraglutide (VICTOZA) 18 MG/3ML Subcutaneous Solution Pen-injector 1.8mg once d and Byetta twice a day (total 5 times per day) Disp:  Rfl:    Montelukast Sodium 10 MG Oral Tab Take 1 tablet (10 mg total) by mouth nightly.  Disp: 30 tablet Rfl: 11   Biotin 5000 MCG Oral Cap  Disp:  Rfl:    Calcium Carbonate-Vit D-Min (CALCIUM 1200) 1200 110/70   Pulse 74   Temp 98.3 °F (36.8 °C) (Oral)   Resp 20   Ht 60\"   Wt 141 lb   Breastfeeding? No   BMI 27.54 kg/m²   Body mass index is 27.54 kg/m². Physical Exam   Vitals reviewed. Constitutional: She is oriented to person, place, and time.  She ap Liraglutide (VICTOZA) 18 MG/3ML Subcutaneous Solution Pen-injector          Si.8mg once daily          Dispense:  9 mL          Refill:  1          Order Comments: ATTN: The order is for generic Victoza      NOVOFINE PLUS 32G X 4 MM Does not apply Misc

## 2019-05-07 NOTE — PATIENT INSTRUCTIONS
Let me know when you restart the sensor so we can look at your trends    Continue: Victoza 1.2 mg once daily - increase to next dose   Lantus to 20 units once daily   NOVOLOG: only use when BG is above 250     Only take NOVOLOG when Blood sugar above 250 m

## 2019-05-16 ENCOUNTER — TELEPHONE (OUTPATIENT)
Dept: ENDOCRINOLOGY CLINIC | Facility: CLINIC | Age: 79
End: 2019-05-16

## 2019-05-16 NOTE — TELEPHONE ENCOUNTER
Spoke with patient about negative RUFINO/ISlet cell antibody   Cpeptide 0.8 : however positive response with BG trends since starting VIctoza  Has not needed any novolog   Pt reports she wanted to stop by and download dexcom for BG review - advised pt our off

## 2019-06-04 ENCOUNTER — TELEPHONE (OUTPATIENT)
Dept: ENDOCRINOLOGY CLINIC | Facility: CLINIC | Age: 79
End: 2019-06-04

## 2019-06-04 DIAGNOSIS — Z79.4 LONG-TERM INSULIN USE (HCC): ICD-10-CM

## 2019-06-04 DIAGNOSIS — E11.65 UNCONTROLLED TYPE 2 DIABETES MELLITUS WITH HYPERGLYCEMIA, WITH LONG-TERM CURRENT USE OF INSULIN (HCC): ICD-10-CM

## 2019-06-04 DIAGNOSIS — E11.40 TYPE 2 DIABETES, UNCONTROLLED, WITH NEUROPATHY (HCC): ICD-10-CM

## 2019-06-04 DIAGNOSIS — E11.65 TYPE 2 DIABETES, UNCONTROLLED, WITH NEUROPATHY (HCC): ICD-10-CM

## 2019-06-04 DIAGNOSIS — Z79.4 UNCONTROLLED TYPE 2 DIABETES MELLITUS WITH HYPERGLYCEMIA, WITH LONG-TERM CURRENT USE OF INSULIN (HCC): ICD-10-CM

## 2019-06-04 NOTE — TELEPHONE ENCOUNTER
Her trends look really good   Some lows/usually trending into hypo range overnight.      Dexcom download:     Average glucose: 133 mg/dl   In target range:  (70-180mg/dl) :85%   High glucose targets: (> 180mg/dl) : 14 %   Low glucose targets:  (less than 70

## 2019-06-04 NOTE — TELEPHONE ENCOUNTER
GAUTAMOVM to inform pt. to decrease Lantus insulin dose from 20 units to 16 units at bedtime due to hypoglycemia overnight. She is to continue Victoza @1.8 mg daily & use Novolog before meals if BG >250 mg/dL. Encourgaed to call back with any questions.

## 2019-06-06 ENCOUNTER — PATIENT OUTREACH (OUTPATIENT)
Dept: INTERNAL MEDICINE CLINIC | Facility: CLINIC | Age: 79
End: 2019-06-06

## 2019-06-10 ENCOUNTER — OFFICE VISIT (OUTPATIENT)
Dept: RHEUMATOLOGY | Facility: CLINIC | Age: 79
End: 2019-06-10
Payer: MEDICARE

## 2019-06-10 VITALS
HEART RATE: 68 BPM | SYSTOLIC BLOOD PRESSURE: 122 MMHG | RESPIRATION RATE: 16 BRPM | WEIGHT: 136 LBS | BODY MASS INDEX: 27 KG/M2 | DIASTOLIC BLOOD PRESSURE: 82 MMHG

## 2019-06-10 DIAGNOSIS — M79.7 FIBROMYALGIA: Chronic | ICD-10-CM

## 2019-06-10 DIAGNOSIS — M35.01 SJOGREN'S SYNDROME WITH KERATOCONJUNCTIVITIS SICCA (HCC): Primary | ICD-10-CM

## 2019-06-10 DIAGNOSIS — M47.816 SPONDYLOSIS OF LUMBAR REGION WITHOUT MYELOPATHY OR RADICULOPATHY: ICD-10-CM

## 2019-06-10 PROCEDURE — 99213 OFFICE O/P EST LOW 20 MIN: CPT | Performed by: INTERNAL MEDICINE

## 2019-06-10 RX ORDER — HYDROCODONE BITARTRATE AND ACETAMINOPHEN 10; 325 MG/1; MG/1
1 TABLET ORAL EVERY 6 HOURS PRN
Qty: 120 TABLET | Refills: 0 | Status: SHIPPED | OUTPATIENT
Start: 2019-07-10 | End: 2019-07-15

## 2019-06-10 RX ORDER — HYDROCODONE BITARTRATE AND ACETAMINOPHEN 10; 325 MG/1; MG/1
1 TABLET ORAL EVERY 6 HOURS PRN
Qty: 120 TABLET | Refills: 0 | Status: SHIPPED | OUTPATIENT
Start: 2019-06-10 | End: 2019-07-10

## 2019-06-10 RX ORDER — CHLORHEXIDINE GLUCONATE 0.12 MG/ML
RINSE ORAL
COMMUNITY
Start: 2019-06-08 | End: 2019-10-17 | Stop reason: ALTCHOICE

## 2019-06-10 RX ORDER — AMOXICILLIN 500 MG/1
CAPSULE ORAL
COMMUNITY
Start: 2019-06-08 | End: 2019-06-10

## 2019-06-10 RX ORDER — HYDROCODONE BITARTRATE AND ACETAMINOPHEN 10; 325 MG/1; MG/1
1 TABLET ORAL EVERY 6 HOURS PRN
Qty: 120 TABLET | Refills: 0 | Status: SHIPPED | OUTPATIENT
Start: 2019-08-09 | End: 2019-09-08

## 2019-06-10 RX ORDER — BLOOD-GLUCOSE SENSOR
EACH MISCELLANEOUS
COMMUNITY

## 2019-06-10 NOTE — PROGRESS NOTES
[facility]  Dr. Haley Rao Progress Note     Subjective:   Josie Morales is a(n) 77year old female. Current complaints: Patient presents with:  Sjogren's Syndrome: 6 month f/u.  Pt states 'is doing well.'  Refill Request: norco   No major complaints

## 2019-06-10 NOTE — PATIENT INSTRUCTIONS
Use Norco for pain 10 mg every 6 hours as needed. Use Biotene mouthwash and drink plenty of water. Brush your teeth regularly and use your mouth guard. Return to office 6 months.

## 2019-06-26 ENCOUNTER — OFFICE VISIT (OUTPATIENT)
Dept: ENDOCRINOLOGY CLINIC | Facility: CLINIC | Age: 79
End: 2019-06-26
Payer: MEDICARE

## 2019-06-26 VITALS
RESPIRATION RATE: 20 BRPM | SYSTOLIC BLOOD PRESSURE: 106 MMHG | HEART RATE: 82 BPM | WEIGHT: 136 LBS | HEIGHT: 60 IN | BODY MASS INDEX: 26.7 KG/M2 | DIASTOLIC BLOOD PRESSURE: 78 MMHG

## 2019-06-26 DIAGNOSIS — Z79.4 TYPE 2 DIABETES MELLITUS WITHOUT COMPLICATION, WITH LONG-TERM CURRENT USE OF INSULIN (HCC): Primary | ICD-10-CM

## 2019-06-26 DIAGNOSIS — E11.9 TYPE 2 DIABETES MELLITUS WITHOUT COMPLICATION, WITH LONG-TERM CURRENT USE OF INSULIN (HCC): Primary | ICD-10-CM

## 2019-06-26 PROCEDURE — 95251 CONT GLUC MNTR ANALYSIS I&R: CPT | Performed by: NURSE PRACTITIONER

## 2019-06-26 PROCEDURE — 99214 OFFICE O/P EST MOD 30 MIN: CPT | Performed by: NURSE PRACTITIONER

## 2019-06-26 PROCEDURE — 83036 HEMOGLOBIN GLYCOSYLATED A1C: CPT | Performed by: NURSE PRACTITIONER

## 2019-06-26 RX ORDER — METFORMIN HYDROCHLORIDE 500 MG/1
500 TABLET, EXTENDED RELEASE ORAL 2 TIMES DAILY
Qty: 60 TABLET | Refills: 2 | Status: SHIPPED | OUTPATIENT
Start: 2019-06-26 | End: 2019-07-15

## 2019-06-26 NOTE — PATIENT INSTRUCTIONS
Your A1C: 7.0% (down from 8.4%)     This is very good however overnight I am seeing some low 70 trends   We have turned the Dexcom alarm on for low alert so now you will get alerts when the blood sugar is less than 70 which is a good alert.    On your dexco

## 2019-06-26 NOTE — PROGRESS NOTES
Guzman Donis is a 77year old female presents today for  diabetes management.    Primary care physician: Toan Muse MD     In the past 3m her DM control has improved to 7.0%  (last A1C 8.4%)   Has been doing well on Victoza and needs very minimal pra 194 (H) 03/22/2019       Lab Results   Component Value Date    CHOLEST 146 03/22/2019    TRIG 52 03/22/2019    HDL 72 (H) 03/22/2019    LDL 64 03/22/2019    MICROALBCREA 17.9 03/22/2019    CREATSERUM 0.75 03/22/2019    GFRNAA 76 03/22/2019    GFRAA 88 03/2 migraines   • Hypertension    • IBS (irritable bowel syndrome)    • Irregular bowel habits    • Leaking of urine    • Nausea    • Night sweats    •  (normal spontaneous vaginal delivery)     x 3 (, , )   • OSTEOARTHRITIS    • Pain in joints Comment: occasionally    Drug use: No    Family History   Problem Relation Age of Onset   • Diabetes Father         Type 2 DM   • Cancer Son         skin cancer/melanoma   • Diabetes Sister         One sister with Type 2 DM   • Breast Cancer Sister 39 Dihydrochloride (XYZAL) 5 MG Oral Tab Take 1 tablet (5 mg total) by mouth daily.  Disp: 30 tablet Rfl: 11   raNITIdine HCl 300 MG Oral Tab TAKE ONE TABLET BY MOUTH AT BEDTIME Disp: 90 tablet Rfl: 3   Pantoprazole Sodium 40 MG Oral Tab EC take 1 tablet by mo MELATONIN 1 tablet nightly.  Disp:  Rfl:    VITAMIN D 2000 UNIT OR TABS 1 TABLET DAILY Disp:  Rfl:    VAGIFEM 10 MCG Vaginal Tab INSERT 1 TABLET (10 MCG) BY VAGINAL ROUTE TWICE WEEKLY (Patient taking differently: INSERT 1 TABLET (10 MCG) BY VAGINAL ROUTE it is mportant for us to avoid hypoglycemia and slightly higher blood glucose levels should be accepted because we are more interested in preventing serious hypoglycemia episodes and side effects versus long term complications.   Continue Dexcom G5   Set L

## 2019-06-28 DIAGNOSIS — F34.1 DYSTHYMIA: ICD-10-CM

## 2019-06-28 RX ORDER — BUPROPION HYDROCHLORIDE 300 MG/1
TABLET ORAL
Qty: 90 TABLET | Refills: 0 | Status: SHIPPED | OUTPATIENT
Start: 2019-06-28 | End: 2019-10-12

## 2019-07-04 DIAGNOSIS — E11.65 UNCONTROLLED TYPE 2 DIABETES MELLITUS WITH HYPERGLYCEMIA, WITH LONG-TERM CURRENT USE OF INSULIN (HCC): ICD-10-CM

## 2019-07-04 DIAGNOSIS — Z79.4 LONG-TERM INSULIN USE (HCC): ICD-10-CM

## 2019-07-04 DIAGNOSIS — Z79.4 UNCONTROLLED TYPE 2 DIABETES MELLITUS WITH HYPERGLYCEMIA, WITH LONG-TERM CURRENT USE OF INSULIN (HCC): ICD-10-CM

## 2019-07-14 DIAGNOSIS — F34.1 DYSTHYMIA: ICD-10-CM

## 2019-07-14 DIAGNOSIS — I10 HYPERTENSION, ESSENTIAL, BENIGN: ICD-10-CM

## 2019-07-14 DIAGNOSIS — E78.5 HYPERLIPIDEMIA LDL GOAL <100: ICD-10-CM

## 2019-07-15 ENCOUNTER — OFFICE VISIT (OUTPATIENT)
Dept: INTERNAL MEDICINE CLINIC | Facility: CLINIC | Age: 79
End: 2019-07-15
Payer: MEDICARE

## 2019-07-15 VITALS
WEIGHT: 136 LBS | SYSTOLIC BLOOD PRESSURE: 112 MMHG | BODY MASS INDEX: 26.7 KG/M2 | RESPIRATION RATE: 16 BRPM | HEART RATE: 82 BPM | TEMPERATURE: 98 F | HEIGHT: 60 IN | DIASTOLIC BLOOD PRESSURE: 62 MMHG

## 2019-07-15 DIAGNOSIS — E11.9 TYPE 2 DIABETES MELLITUS WITHOUT COMPLICATION, WITH LONG-TERM CURRENT USE OF INSULIN (HCC): ICD-10-CM

## 2019-07-15 DIAGNOSIS — E11.9 TYPE 2 DIABETES MELLITUS WITHOUT COMPLICATION, WITH LONG-TERM CURRENT USE OF INSULIN (HCC): Primary | ICD-10-CM

## 2019-07-15 DIAGNOSIS — Z79.4 TYPE 2 DIABETES MELLITUS WITHOUT COMPLICATION, WITH LONG-TERM CURRENT USE OF INSULIN (HCC): ICD-10-CM

## 2019-07-15 DIAGNOSIS — Z79.4 TYPE 2 DIABETES MELLITUS WITHOUT COMPLICATION, WITH LONG-TERM CURRENT USE OF INSULIN (HCC): Primary | ICD-10-CM

## 2019-07-15 DIAGNOSIS — I10 HYPERTENSION, ESSENTIAL, BENIGN: ICD-10-CM

## 2019-07-15 DIAGNOSIS — R30.0 DYSURIA: ICD-10-CM

## 2019-07-15 DIAGNOSIS — F34.1 DYSTHYMIA: ICD-10-CM

## 2019-07-15 DIAGNOSIS — I20.8 ATYPICAL ANGINA (HCC): ICD-10-CM

## 2019-07-15 DIAGNOSIS — N30.90 CYSTITIS: ICD-10-CM

## 2019-07-15 LAB
APPEARANCE: CLEAR
BILIRUBIN: NEGATIVE
GLUCOSE (URINE DIPSTICK): NEGATIVE MG/DL
KETONES (URINE DIPSTICK): NEGATIVE MG/DL
LEUKOCYTES: NEGATIVE
MULTISTIX LOT#: NORMAL NUMERIC
NITRITE, URINE: NEGATIVE
OCCULT BLOOD: NEGATIVE
PH, URINE: 5.5 (ref 4.5–8)
PROTEIN (URINE DIPSTICK): NEGATIVE MG/DL
SPECIFIC GRAVITY: 1.03 (ref 1–1.03)
UROBILINOGEN,SEMI-QN: 0.2 MG/DL (ref 0–1.9)

## 2019-07-15 PROCEDURE — 99214 OFFICE O/P EST MOD 30 MIN: CPT | Performed by: INTERNAL MEDICINE

## 2019-07-15 PROCEDURE — 81003 URINALYSIS AUTO W/O SCOPE: CPT | Performed by: INTERNAL MEDICINE

## 2019-07-15 RX ORDER — METOPROLOL SUCCINATE 25 MG/1
TABLET, EXTENDED RELEASE ORAL
Qty: 90 TABLET | Refills: 1 | Status: SHIPPED | OUTPATIENT
Start: 2019-07-15 | End: 2020-03-22

## 2019-07-15 RX ORDER — FLUOXETINE HYDROCHLORIDE 20 MG/1
CAPSULE ORAL
Qty: 90 CAPSULE | Refills: 1 | Status: SHIPPED | OUTPATIENT
Start: 2019-07-15 | End: 2020-03-22

## 2019-07-15 RX ORDER — ATORVASTATIN CALCIUM 40 MG/1
TABLET, FILM COATED ORAL
Qty: 90 TABLET | Refills: 1 | Status: SHIPPED | OUTPATIENT
Start: 2019-07-15 | End: 2020-03-22

## 2019-07-15 RX ORDER — METFORMIN HYDROCHLORIDE 500 MG/1
500 TABLET, EXTENDED RELEASE ORAL 2 TIMES DAILY
Qty: 180 TABLET | Refills: 2 | Status: SHIPPED | OUTPATIENT
Start: 2019-07-15 | End: 2019-09-30

## 2019-07-15 NOTE — PROGRESS NOTES
HPI:    Patient ID: Armand Balderrama is a 77year old female. HPI  HPI:   Armand Balderrama is a 77year old female who presents for recheck of her diabetes, htn , dysthymia.  Patient’s FBS have been controlled and more importantly no hypoglycemia since o 40 MG Oral Tab TAKE ONE TABLET BY MOUTH NIGHTLY *need appointment* Disp: 90 tablet Rfl: 1   FLUOXETINE HCL 20 MG Oral Cap TAKE ONE CAPSULE BY MOUTH ONCE DAILY Disp: 90 capsule Rfl: 1   METOPROLOL SUCCINATE ER 25 MG Oral Tablet 24 Hr TAKE ONE TABLET BY MOUT MCG Oral Tab TAKE 1 TABLET BY MOUTH EVERY MORNING BEFORE BREAKFAST Disp: 90 tablet Rfl: 2   VERAPAMIL  MG Oral Tab TAKE ONE TABLET BY MOUTH THREE TIMES DAILY  Disp: 270 tablet Rfl: 1   Betamethasone Dipropionate 0.05 % External Lotion Apply 1 mL top bleeding    • Fibromyalgia    • Flatulence/gas pain/belching    • HEADACHES     migraines   • Hearing loss    • Hemorrhoids    • High cholesterol    • History of depression    • HOSPITALIZATIONS     work up for TIA vs. stroke and was dx with migraines   • Social History: Social History    Tobacco Use      Smoking status: Former Smoker        Packs/day: 1.00        Years: 8.00        Pack years: 8        Quit date: 1967        Years since quittin.1      Smokeless tobacco: Never Used    Alcohol use asked to return in 3 m.     Review of Systems         Current Outpatient Medications:  ATORVASTATIN 40 MG Oral Tab TAKE ONE TABLET BY MOUTH NIGHTLY *need appointment* Disp: 90 tablet Rfl: 1   FLUOXETINE HCL 20 MG Oral Cap TAKE ONE CAPSULE BY MOUTH ONCE MARISEL tablets under the tongue every 4-6 hours as needed Disp: 30 tablet Rfl: 3   LEVOTHYROXINE SODIUM 125 MCG Oral Tab TAKE 1 TABLET BY MOUTH EVERY MORNING BEFORE BREAKFAST Disp: 90 tablet Rfl: 2   VERAPAMIL  MG Oral Tab TAKE ONE TABLET BY MOUTH THREE TI inside mouth  Mri [Gadolinium Rj*    Tightness in Throat    Comment:PT STATES SHE THINKS SHE HAD REACTION TO CONTRAST             FOR AN MRI YEARS AGO. IODINE MARKED IN CHART. ADDED MRI DYE AS A PRECAUTION.   Nasalcrom               RASH    Com

## 2019-07-31 ENCOUNTER — TELEPHONE (OUTPATIENT)
Dept: ENDOCRINOLOGY CLINIC | Facility: CLINIC | Age: 79
End: 2019-07-31

## 2019-07-31 NOTE — TELEPHONE ENCOUNTER
Reviewed dexcom  Average 122mg/dl  4 % hypoglycemia - all trends are overnight    Recommend decreasing basal, Lantus from 12 units to 10 units (pt already did ) but if low alarms continue, decrease to 8 units    With next refill, we may want to trial Carin Chicas

## 2019-07-31 NOTE — TELEPHONE ENCOUNTER
Patient walked in with [facility]  stating she has had alarms for low BS the last week. I downloaded the Dexcom, reports can be viewed by logging into [employer]. She did adjust her Lantus the last two days from 12 units to 10 units at night.  Kofi

## 2019-08-01 NOTE — TELEPHONE ENCOUNTER
Spoke with patient. She had one additional low alert on her Dexcom since reducing her Lantus from 12 to 10 but when she tested her BG it was 72 which wasn't as low as the Dexcom had said.  Discussed that should she get two low alerts within one week's time,

## 2019-08-11 DIAGNOSIS — I10 HYPERTENSION, ESSENTIAL, BENIGN: ICD-10-CM

## 2019-08-11 RX ORDER — VERAPAMIL HYDROCHLORIDE 120 MG/1
TABLET, FILM COATED ORAL
Qty: 270 TABLET | Refills: 1 | Status: SHIPPED | OUTPATIENT
Start: 2019-08-11 | End: 2019-10-11

## 2019-08-15 DIAGNOSIS — E11.65 TYPE 2 DIABETES, UNCONTROLLED, WITH NEUROPATHY (HCC): ICD-10-CM

## 2019-08-15 DIAGNOSIS — E11.40 TYPE 2 DIABETES, UNCONTROLLED, WITH NEUROPATHY (HCC): ICD-10-CM

## 2019-08-16 RX ORDER — LIRAGLUTIDE 6 MG/ML
INJECTION SUBCUTANEOUS
Qty: 9 ML | Refills: 3 | Status: SHIPPED | OUTPATIENT
Start: 2019-08-16 | End: 2019-12-01

## 2019-09-09 ENCOUNTER — TELEPHONE (OUTPATIENT)
Dept: ENDOCRINOLOGY CLINIC | Facility: CLINIC | Age: 79
End: 2019-09-09

## 2019-09-09 NOTE — TELEPHONE ENCOUNTER
Elias Mcconnell called letting us know [employer] will be faxing paperwork for her supplies and requesting chart notes. Please keep eye out for paperwork and send back asap.

## 2019-09-09 NOTE — TELEPHONE ENCOUNTER
Sent Medicare CMN and LOV note from 6/26/19 to Vanesa Rey at [place]. Fax [phone]. Received confirmation of fax completion.   CMN sent to scan

## 2019-09-30 ENCOUNTER — OFFICE VISIT (OUTPATIENT)
Dept: ENDOCRINOLOGY CLINIC | Facility: CLINIC | Age: 79
End: 2019-09-30
Payer: MEDICARE

## 2019-09-30 VITALS
DIASTOLIC BLOOD PRESSURE: 80 MMHG | BODY MASS INDEX: 24.94 KG/M2 | HEIGHT: 60 IN | SYSTOLIC BLOOD PRESSURE: 110 MMHG | WEIGHT: 127 LBS | HEART RATE: 81 BPM

## 2019-09-30 DIAGNOSIS — Z79.4 TYPE 2 DIABETES MELLITUS WITHOUT COMPLICATION, WITH LONG-TERM CURRENT USE OF INSULIN (HCC): Primary | ICD-10-CM

## 2019-09-30 DIAGNOSIS — E11.9 TYPE 2 DIABETES MELLITUS WITHOUT COMPLICATION, WITH LONG-TERM CURRENT USE OF INSULIN (HCC): Primary | ICD-10-CM

## 2019-09-30 LAB
CARTRIDGE LOT#: 541 NUMERIC
HEMOGLOBIN A1C: 6.2 % (ref 4.3–5.6)

## 2019-09-30 PROCEDURE — 95251 CONT GLUC MNTR ANALYSIS I&R: CPT | Performed by: NURSE PRACTITIONER

## 2019-09-30 PROCEDURE — 99214 OFFICE O/P EST MOD 30 MIN: CPT | Performed by: NURSE PRACTITIONER

## 2019-09-30 PROCEDURE — 83036 HEMOGLOBIN GLYCOSYLATED A1C: CPT | Performed by: NURSE PRACTITIONER

## 2019-09-30 RX ORDER — METFORMIN HYDROCHLORIDE 500 MG/1
500 TABLET, EXTENDED RELEASE ORAL 2 TIMES DAILY WITH MEALS
Qty: 180 TABLET | Refills: 2 | Status: SHIPPED | OUTPATIENT
Start: 2019-09-30 | End: 2020-09-08 | Stop reason: ALTCHOICE

## 2019-09-30 NOTE — PROGRESS NOTES
Viv Warner is a 77year old female presents today for  diabetes management.    Primary care physician: Mona Owens MD     In the past 3m her DM control has improved: A1C today is 6.2% (down from 7.0%)   At last visit we restarted Metformin XR 500mg: Value Date    CHOLEST 146 03/22/2019    TRIG 52 03/22/2019    HDL 72 (H) 03/22/2019    LDL 64 03/22/2019    MICROALBCREA 17.9 03/22/2019    CREATSERUM 0.75 03/22/2019    GFRNAA 76 03/22/2019    GFRAA 88 03/22/2019     Component      Latest Ref Rng & Units syndrome)    • Irregular bowel habits    • Leaking of urine    • Nausea    • Night sweats    •  (normal spontaneous vaginal delivery)     x 3 (, , )   • OSTEOARTHRITIS    • Pain in joints    • Pain with bowel movements    • Pain, upper back History   Problem Relation Age of Onset   • Diabetes Father         Type 2 DM   • Cancer Son         skin cancer/melanoma   • Diabetes Sister         One sister with Type 2 DM   • Breast Cancer Sister 39         at 52   • Diabetes Brother         Type Disp: 90 tablet Rfl: 1   Levocetirizine Dihydrochloride (XYZAL) 5 MG Oral Tab Take 1 tablet (5 mg total) by mouth daily.  Disp: 30 tablet Rfl: 11   raNITIdine HCl 300 MG Oral Tab TAKE ONE TABLET BY MOUTH AT BEDTIME Disp: 90 tablet Rfl: 3   Pantoprazole Sodi palpitations and leg swelling. Gastrointestinal: Positive for constipation. Negative for nausea and diarrhea. Transient    Endocrine: Negative for polydipsia, polyphagia and polyuria. Genitourinary: Negative for dysuria.    Musculoskeletal: Negat treatment using the Rule of 15' and when to call DM center  Reminded about proper site rotation and avoid lipodystrophy   F/u 3m  but call sooner if any issues.        Orders Placed This Encounter      Hgb A1C      metFORMIN HCl  MG Oral Tablet 24 Hr

## 2019-09-30 NOTE — PATIENT INSTRUCTIONS
A1C 6.2 (down from 7.0%)   Your weight is down 15 pounds from spring ~     Try to stop Lantus   Continue :   Victoza 1.8mg once daily   Metformin XR 500mg twice daily     Use novolog as needed     Continue wearing Dexcom to keep close watch on your trends

## 2019-10-04 ENCOUNTER — TELEPHONE (OUTPATIENT)
Dept: ENDOCRINOLOGY CLINIC | Facility: CLINIC | Age: 79
End: 2019-10-04

## 2019-10-04 NOTE — TELEPHONE ENCOUNTER
Patient walked in today requesting her Dexcom be downloaded. She stated Ekta Garland changed medication at last visit. Downloaded Dexcom and routed information to [facility] for review.

## 2019-10-04 NOTE — TELEPHONE ENCOUNTER
Was having lows on 6 u basal. Due to age her BG trends can run a little higher   Will review BG trends in 1 week to assess if she needs basal at lower dose than 6 u yao

## 2019-10-07 ENCOUNTER — TELEPHONE (OUTPATIENT)
Dept: ENDOCRINOLOGY CLINIC | Facility: CLINIC | Age: 79
End: 2019-10-07

## 2019-10-07 NOTE — TELEPHONE ENCOUNTER
Can we call and check in on her BG trends   Recently stopped basal but she called last week to report higher fastings  If her fastings are above 140  mg/dl in AM more than 3 d in a week, we could revisit 4 units of Lantus but be cautious of lows.    Her las

## 2019-10-07 NOTE — TELEPHONE ENCOUNTER
[facility] and she stated that her glucose is high around midnight (200 mg/dl), but that by 4AM-6AM they are 100-120 mg/dl. She would like to know if her medication regime needs to change. Please advise.

## 2019-10-08 NOTE — TELEPHONE ENCOUNTER
Spoke to pt ok per CAB and told her that is ok for her to bring her dexcom again in 2 weeks to keep a look out on her levels now that she stop her insulin pt verbalize understanding.

## 2019-10-11 ENCOUNTER — OFFICE VISIT (OUTPATIENT)
Dept: INTERNAL MEDICINE CLINIC | Facility: CLINIC | Age: 79
End: 2019-10-11
Payer: MEDICARE

## 2019-10-11 ENCOUNTER — TELEPHONE (OUTPATIENT)
Dept: INTERNAL MEDICINE CLINIC | Facility: CLINIC | Age: 79
End: 2019-10-11

## 2019-10-11 VITALS
SYSTOLIC BLOOD PRESSURE: 150 MMHG | OXYGEN SATURATION: 96 % | RESPIRATION RATE: 18 BRPM | HEART RATE: 83 BPM | DIASTOLIC BLOOD PRESSURE: 90 MMHG | WEIGHT: 126 LBS | TEMPERATURE: 99 F | BODY MASS INDEX: 25 KG/M2

## 2019-10-11 DIAGNOSIS — K52.9 GASTROENTERITIS: ICD-10-CM

## 2019-10-11 DIAGNOSIS — I10 HYPERTENSION, ESSENTIAL, BENIGN: Primary | ICD-10-CM

## 2019-10-11 PROCEDURE — 99213 OFFICE O/P EST LOW 20 MIN: CPT | Performed by: NURSE PRACTITIONER

## 2019-10-11 RX ORDER — VERAPAMIL HYDROCHLORIDE 120 MG/1
120 TABLET, FILM COATED ORAL 2 TIMES DAILY
Qty: 270 TABLET | Refills: 1 | COMMUNITY
Start: 2019-10-11 | End: 2020-04-22

## 2019-10-11 RX ORDER — HYDROCHLOROTHIAZIDE 25 MG/1
25 TABLET ORAL DAILY
Qty: 30 TABLET | Refills: 0 | Status: SHIPPED | OUTPATIENT
Start: 2019-10-11 | End: 2020-11-03

## 2019-10-11 NOTE — TELEPHONE ENCOUNTER
Pt had some surgery on her gums about 3 days ago and they told her that her BP is high, she just took it at 12:30pm and it was 158/91, wondering if she needs to be seen, call back

## 2019-10-12 DIAGNOSIS — I10 HYPERTENSION, ESSENTIAL, BENIGN: ICD-10-CM

## 2019-10-12 DIAGNOSIS — F34.1 DYSTHYMIA: ICD-10-CM

## 2019-10-12 NOTE — PROGRESS NOTES
HPI:    Patient ID: Armand Balderrama is a 77year old female. Patient presents with:  Hypertension: saw dentist x3 days ago b/p checked was (150/100) approx  Headache      HPI:    Patient ID: Armand Balderrama is a 77year old female.     Patient presents cholesterol    • History of depression    • HOSPITALIZATIONS     work up for TIA vs. stroke and was dx with migraines   • Hypertension    • IBS (irritable bowel syndrome)    • Irregular bowel habits    • Leaking of urine    • Nausea    • Night sweats    • Sister         One sister with Type 2 DM   • Breast Cancer Sister 39         at 52   • Diabetes Brother         Type 2 DM   • Heart Disorder Mother    • Hypertension Son    • Diabetes Paternal Grandfather         Type 2 DM   • Thyroid Disorder Other % Mouth/Throat Solution, , Disp: , Rfl:   Continuous Blood Gluc Sensor (DEXCOM G4 SENSOR) Does not apply Misc, by Does not apply route., Disp: , Rfl:   insulin glargine (LANTUS SOLOSTAR) 100 UNIT/ML Subcutaneous Solution Pen-injector, Inject 12 Units into For insulin use 3 times per day, Disp: 300 each, Rfl: 1  MELATONIN, 1 tablet nightly., Disp: , Rfl:   VAGIFEM 10 MCG Vaginal Tab, INSERT 1 TABLET (10 MCG) BY VAGINAL ROUTE TWICE WEEKLY (Patient taking differently: INSERT 1 TABLET (10 MCG) BY VAGINAL ROUTE RBC 4.34 03/22/2019    HGB 13.3 03/22/2019    HCT 41.9 03/22/2019    MCV 96.5 03/22/2019    MCH 30.6 03/22/2019    MCHC 31.7 03/22/2019    RDW 13.1 03/22/2019    .0 03/22/2019    MPV 10.3 02/05/2013     Lab Results   Component Value Date    MARS hydrochlorothiazide 25 MG Oral Tab; Take 1 tablet (25 mg total) by mouth daily.     Gastroenteritis- rest, BRAT mayda,        KM Puga

## 2019-10-14 RX ORDER — IRBESARTAN 300 MG/1
300 TABLET ORAL DAILY
Qty: 90 TABLET | Refills: 1 | Status: SHIPPED | OUTPATIENT
Start: 2019-10-14 | End: 2020-01-04

## 2019-10-14 RX ORDER — BUPROPION HYDROCHLORIDE 300 MG/1
TABLET ORAL
Qty: 90 TABLET | Refills: 1 | Status: SHIPPED | OUTPATIENT
Start: 2019-10-14 | End: 2020-01-06

## 2019-10-14 NOTE — PROGRESS NOTES
My chart messaged pt regarding the need for stress test completion. Gave central scheduling phone number if needed.

## 2019-10-17 ENCOUNTER — OFFICE VISIT (OUTPATIENT)
Dept: RHEUMATOLOGY | Facility: CLINIC | Age: 79
End: 2019-10-17
Payer: MEDICARE

## 2019-10-17 VITALS
HEART RATE: 80 BPM | WEIGHT: 123 LBS | HEIGHT: 60 IN | BODY MASS INDEX: 24.15 KG/M2 | RESPIRATION RATE: 18 BRPM | DIASTOLIC BLOOD PRESSURE: 80 MMHG | SYSTOLIC BLOOD PRESSURE: 126 MMHG

## 2019-10-17 DIAGNOSIS — M79.7 FIBROMYALGIA: Chronic | ICD-10-CM

## 2019-10-17 DIAGNOSIS — M35.01 SJOGREN'S SYNDROME WITH KERATOCONJUNCTIVITIS SICCA (HCC): Primary | ICD-10-CM

## 2019-10-17 PROCEDURE — 99213 OFFICE O/P EST LOW 20 MIN: CPT | Performed by: INTERNAL MEDICINE

## 2019-10-17 RX ORDER — HYDROCODONE BITARTRATE AND ACETAMINOPHEN 10; 325 MG/1; MG/1
1 TABLET ORAL EVERY 6 HOURS PRN
Qty: 120 TABLET | Refills: 0 | Status: SHIPPED | OUTPATIENT
Start: 2019-11-17 | End: 2019-10-21 | Stop reason: ALTCHOICE

## 2019-10-17 RX ORDER — PILOCARPINE HYDROCHLORIDE 5 MG/1
5 TABLET, FILM COATED ORAL 2 TIMES DAILY
Qty: 60 TABLET | Refills: 5 | Status: SHIPPED | OUTPATIENT
Start: 2019-10-17 | End: 2019-10-21 | Stop reason: ALTCHOICE

## 2019-10-17 RX ORDER — HYDROCODONE BITARTRATE AND ACETAMINOPHEN 10; 325 MG/1; MG/1
1 TABLET ORAL EVERY 6 HOURS PRN
Qty: 120 TABLET | Refills: 0 | Status: SHIPPED | OUTPATIENT
Start: 2019-12-17 | End: 2019-10-21 | Stop reason: ALTCHOICE

## 2019-10-17 RX ORDER — HYDROCODONE BITARTRATE AND ACETAMINOPHEN 10; 325 MG/1; MG/1
1 TABLET ORAL EVERY 6 HOURS PRN
Qty: 120 TABLET | Refills: 0 | Status: SHIPPED | OUTPATIENT
Start: 2019-10-17 | End: 2019-11-16

## 2019-10-17 RX ORDER — PILOCARPINE HYDROCHLORIDE 5 MG/1
5 TABLET, FILM COATED ORAL 2 TIMES DAILY
Qty: 60 TABLET | Refills: 3 | Status: SHIPPED | OUTPATIENT
Start: 2019-10-17 | End: 2019-10-17 | Stop reason: CLARIF

## 2019-10-17 NOTE — PROGRESS NOTES
[facility]  Dr. Erica Redd Progress Note     Subjective:   Viv Warner is a(n) 77year old female.    Current complaints: Patient presents with:  Sjogren's Syndrome: mouth and eyes are very dry, loosing teeth  Fibromyalgia Syndrome: No flares, walks,

## 2019-10-17 NOTE — PATIENT INSTRUCTIONS
Use Norco for pain 10 mg every 6 hours as needed for pain. Use Biotene mouth products as needed. Drink plenty of water. Trial of pilocarpine 5 mg twice a day fro dry mouth. Exercise regularly. Return to office 6 months.

## 2019-10-21 ENCOUNTER — OFFICE VISIT (OUTPATIENT)
Dept: INTERNAL MEDICINE CLINIC | Facility: CLINIC | Age: 79
End: 2019-10-21
Payer: MEDICARE

## 2019-10-21 VITALS
RESPIRATION RATE: 16 BRPM | HEIGHT: 60 IN | HEART RATE: 90 BPM | TEMPERATURE: 97 F | BODY MASS INDEX: 24.15 KG/M2 | OXYGEN SATURATION: 97 % | WEIGHT: 123 LBS | SYSTOLIC BLOOD PRESSURE: 130 MMHG | DIASTOLIC BLOOD PRESSURE: 80 MMHG

## 2019-10-21 DIAGNOSIS — R79.89 LOW SERUM C-PEPTIDE: ICD-10-CM

## 2019-10-21 DIAGNOSIS — G43.909 MIGRAINE SYNDROME: ICD-10-CM

## 2019-10-21 DIAGNOSIS — Z99.89 OSA ON CPAP: ICD-10-CM

## 2019-10-21 DIAGNOSIS — Z00.00 ANNUAL PHYSICAL EXAM: Primary | ICD-10-CM

## 2019-10-21 DIAGNOSIS — R42 DIZZINESS: ICD-10-CM

## 2019-10-21 DIAGNOSIS — F51.02 INSOMNIA DUE TO STRESS: ICD-10-CM

## 2019-10-21 DIAGNOSIS — M79.7 FIBROMYALGIA: Chronic | ICD-10-CM

## 2019-10-21 DIAGNOSIS — G47.33 OSA ON CPAP: ICD-10-CM

## 2019-10-21 DIAGNOSIS — E89.0 POSTSURGICAL HYPOTHYROIDISM: ICD-10-CM

## 2019-10-21 DIAGNOSIS — F34.1 DYSTHYMIA: ICD-10-CM

## 2019-10-21 DIAGNOSIS — E11.9 TYPE 2 DIABETES MELLITUS WITHOUT COMPLICATION, WITH LONG-TERM CURRENT USE OF INSULIN (HCC): ICD-10-CM

## 2019-10-21 DIAGNOSIS — Z00.00 ENCOUNTER FOR ANNUAL HEALTH EXAMINATION: ICD-10-CM

## 2019-10-21 DIAGNOSIS — M35.01 SJOGREN'S SYNDROME WITH KERATOCONJUNCTIVITIS SICCA (HCC): ICD-10-CM

## 2019-10-21 DIAGNOSIS — Z79.4 TYPE 2 DIABETES MELLITUS WITHOUT COMPLICATION, WITH LONG-TERM CURRENT USE OF INSULIN (HCC): ICD-10-CM

## 2019-10-21 DIAGNOSIS — I10 HYPERTENSION, ESSENTIAL, BENIGN: ICD-10-CM

## 2019-10-21 DIAGNOSIS — M81.0 AGE-RELATED OSTEOPOROSIS WITHOUT CURRENT PATHOLOGICAL FRACTURE: ICD-10-CM

## 2019-10-21 PROCEDURE — G0439 PPPS, SUBSEQ VISIT: HCPCS | Performed by: INTERNAL MEDICINE

## 2019-10-21 NOTE — PATIENT INSTRUCTIONS
Low-Salt Choices  Eating salt (sodium) can make your body retain too much water. Excess water makes your heart work harder. Canned, packaged, and frozen foods are easy to prepare. But they are often high in sodium.  Here are some ideas for low-salt foods Tests on this list are recommended by your physician but may not be covered, or covered at this frequency, by your insurer. Please check with your insurance carrier before scheduling to verify coverage.    PREVENTATIVE SERVICES  INDICATIONS AND SCHEDULE Int • Men who are 73-68 years old and have smoked more than 100 cigarettes in their lifetime   • Anyone with a family history    Colorectal Cancer Screening  Covered up to Age 76     Colonoscopy Screen   Covered every 10 years- more often if abnormal [place] Recommend Annually to at least age 76, and as needed after 76 There are no preventive care reminders to display for this patient.  Please get this Mammogram regularly   Immunizations      Influenza  Covered Annually Orders placed or performed in visit on 10 An information packet, including necessary form from the [employer] website. http://www. [place].state. [place]/public/books/advin.htm  A link to the [employer].  This site has a lot of good information including definit

## 2019-10-21 NOTE — PROGRESS NOTES
HPI:   Viv Warner is a 77year old female who presents for a Medicare Subsequent Annual Wellness visit (Pt already had Initial Annual Wellness). HPI:  Here for AWV  Normal state of health but does daniel to feeling unbalanced and dizzy at times.  No aspirin therapy.    Jennifer Simpson is unable to use daily aspirin therapy For the following reasons:   Patient decided that the risks of aspirin therapy outweigh the benefits and declines aspirin therapy        CAGE Alcohol screening   Jennifer wong [valdecoxib]; clindamycin; meclizine; mri [gadolinium derivatives]; nasalcrom; viibryd; vilazodone; and zelnorm [tegaserod maleate]. CURRENT MEDICATIONS:   PEPCID 20 MG Oral Tab, Take 20 mg by mouth nightly.   HYDROcodone-acetaminophen  MG Oral Tab INSERT 1 TABLET (10 MCG) BY VAGINAL ROUTE TWICE WEEKLY (Patient taking differently: INSERT 1 TABLET (10 MCG) BY VAGINAL ROUTE every other day)  VITAMIN D 2000 UNIT OR TABS, 1 TABLET DAILY       MEDICAL INFORMATION:   She  has a past medical history of Yovany knee replacement surgery.     Her family history includes Breast Cancer (age of onset: 45) in her paternal cousin female; Breast Cancer (age of onset: 39) in her sister; Breast Cancer (age of onset: 64) in her sister; Cancer in her son; Diabetes in her [de-identified] General Appearance:  Alert, cooperative, no distress, appears stated age   Head:  Normocephalic, without obvious abnormality, atraumatic   Eyes:  PERRL, conjunctiva/corneas clear, EOM's intact both eyes   Ears:  Normal TM's and external ear canals, leia presents for a Medicare Assessment.      PLAN SUMMARY:   Diagnoses and all orders for this visit:    Annual physical exam    Fibromyalgia    Dysthymia    Postsurgical hypothyroidism    Insomnia due to stress    Migraine syndrome    SWETHA on CPAP    Hypertensi provided for quick review of chart, separate sheet to patient  [address] Internal Lab or Procedure External Lab or Procedure   Diabetes Screening      HbgA1C   Annually Lab Results   Component Value Date    A1C 6.2 (A) applicable)     Influenza  Covered Annually 9/8/2017 Please get every year    Pneumococcal 13 (Prevnar)  Covered Once after 65 03/16/2015 Please get once after your 65th birthday    Pneumococcal 23 (Pneumovax)  Covered Once after 65 04/13/2016 Please get o Date Value   05/02/2014 12.7        LDL  Annually LDL Cholesterol (mg/dL)   Date Value   03/22/2019 64     LDL CHOLESTROL (mg/dL)   Date Value   05/02/2014 54    No flowsheet data found.      Dilated Eye exam  Annually Data entered on: 9/13/2017   Sang Anderson 11  Pantoprazole Sodium 40 MG Oral Tab EC, take 1 tablet by mouth two times daily before meals, Disp: 180 tablet, Rfl: 3  hyoscyamine sulfate 0.125 MG Oral Tablet Dispersible, place 1-2 tablets under the tongue every 4-6 hours as needed, Disp: 30 tablet, R COMMENTS)  Bextra [Valdecoxib]     NAUSEA ONLY    Comment:\"TABS\"  Clindamycin             HALLUCINATION    Comment:Olfactory- smell of tires  Meclizine               RASH, OTHER (SEE COMMENTS)    Comment:Sores inside mouth  Mri [Gadolinium Rj*    Tightn

## 2019-10-22 ENCOUNTER — MED REC SCAN ONLY (OUTPATIENT)
Dept: INTERNAL MEDICINE CLINIC | Facility: CLINIC | Age: 79
End: 2019-10-22

## 2019-10-23 ENCOUNTER — TELEPHONE (OUTPATIENT)
Dept: ENDOCRINOLOGY CLINIC | Facility: CLINIC | Age: 79
End: 2019-10-23

## 2019-10-23 NOTE — TELEPHONE ENCOUNTER
Pt was taken off Lantus and was instructed to come in and have someone download her Dexcom. Average sensor reading 149 with 27% above target, 73% within and zero % below target.  Pt states the sensor woke her up once in the middle of the noc with a reading

## 2019-11-01 ENCOUNTER — TELEPHONE (OUTPATIENT)
Dept: INTERNAL MEDICINE CLINIC | Facility: CLINIC | Age: 79
End: 2019-11-01

## 2019-11-01 NOTE — TELEPHONE ENCOUNTER
Pt stated she is having side effects from the Victoza. She has headaches and vomiting.  Call given to triage

## 2019-11-04 NOTE — TELEPHONE ENCOUNTER
Called pt to check in on GI sxs and BG trends. Did decrease Victoza to 1.2 mg once daily  No further nausea or vomiting. Advised pt to stay w 1.2mg Victoza daily dose   Wearing Dexcom- to continue stopping in for downloads.

## 2019-11-09 DIAGNOSIS — I10 HYPERTENSION, ESSENTIAL, BENIGN: ICD-10-CM

## 2019-11-09 DIAGNOSIS — E78.5 HYPERLIPIDEMIA LDL GOAL <100: ICD-10-CM

## 2019-11-10 RX ORDER — METOPROLOL SUCCINATE 25 MG/1
TABLET, EXTENDED RELEASE ORAL
Qty: 90 TABLET | Refills: 1 | Status: SHIPPED | OUTPATIENT
Start: 2019-11-10 | End: 2020-03-23

## 2019-11-10 RX ORDER — ATORVASTATIN CALCIUM 40 MG/1
TABLET, FILM COATED ORAL
Qty: 90 TABLET | Refills: 1 | Status: SHIPPED | OUTPATIENT
Start: 2019-11-10 | End: 2020-03-23

## 2019-11-25 ENCOUNTER — MED REC SCAN ONLY (OUTPATIENT)
Dept: INTERNAL MEDICINE CLINIC | Facility: CLINIC | Age: 79
End: 2019-11-25

## 2019-12-01 DIAGNOSIS — E11.40 TYPE 2 DIABETES, UNCONTROLLED, WITH NEUROPATHY (HCC): ICD-10-CM

## 2019-12-01 DIAGNOSIS — E11.65 TYPE 2 DIABETES, UNCONTROLLED, WITH NEUROPATHY (HCC): ICD-10-CM

## 2019-12-02 RX ORDER — LIRAGLUTIDE 6 MG/ML
INJECTION SUBCUTANEOUS
Qty: 9 ML | Refills: 2 | Status: SHIPPED | OUTPATIENT
Start: 2019-12-02 | End: 2020-01-07

## 2019-12-07 DIAGNOSIS — F34.1 DYSTHYMIA: ICD-10-CM

## 2019-12-09 RX ORDER — FLUOXETINE HYDROCHLORIDE 20 MG/1
CAPSULE ORAL
Qty: 90 CAPSULE | Refills: 1 | Status: SHIPPED | OUTPATIENT
Start: 2019-12-09 | End: 2020-03-23

## 2019-12-11 ENCOUNTER — TELEPHONE (OUTPATIENT)
Dept: ENDOCRINOLOGY CLINIC | Facility: CLINIC | Age: 79
End: 2019-12-11

## 2019-12-11 DIAGNOSIS — E89.0 POSTSURGICAL HYPOTHYROIDISM: ICD-10-CM

## 2019-12-11 RX ORDER — LEVOTHYROXINE SODIUM 0.12 MG/1
TABLET ORAL
Qty: 90 TABLET | Refills: 2 | Status: SHIPPED | OUTPATIENT
Start: 2019-12-11 | End: 2020-06-16

## 2019-12-11 NOTE — TELEPHONE ENCOUNTER
Ashley from [facility] called requesting last chart note be faxed to [phone] to meet Medicare guideline request...  patients supplies to be shipped in December. Chart note from 9/30/19 was faxed conf danielle

## 2019-12-16 DIAGNOSIS — I10 HYPERTENSION, ESSENTIAL, BENIGN: ICD-10-CM

## 2019-12-16 DIAGNOSIS — IMO0002 TYPE 2 DIABETES, UNCONTROLLED, WITH NEUROPATHY: ICD-10-CM

## 2019-12-17 RX ORDER — VERAPAMIL HYDROCHLORIDE 120 MG/1
120 TABLET, FILM COATED ORAL 2 TIMES DAILY
Qty: 180 TABLET | Refills: 1 | Status: SHIPPED | OUTPATIENT
Start: 2019-12-17 | End: 2020-09-23

## 2019-12-17 RX ORDER — LIRAGLUTIDE 6 MG/ML
INJECTION SUBCUTANEOUS
Qty: 9 ML | Refills: 0 | OUTPATIENT
Start: 2019-12-17

## 2019-12-17 RX ORDER — HYDROCODONE BITARTRATE AND ACETAMINOPHEN 10; 325 MG/1; MG/1
TABLET ORAL
Qty: 120 TABLET | Refills: 0 | Status: SHIPPED | OUTPATIENT
Start: 2019-12-17 | End: 2020-06-25

## 2019-12-23 DIAGNOSIS — Z79.4 LONG-TERM INSULIN USE (HCC): ICD-10-CM

## 2019-12-23 DIAGNOSIS — E11.65 UNCONTROLLED TYPE 2 DIABETES MELLITUS WITH HYPERGLYCEMIA, WITH LONG-TERM CURRENT USE OF INSULIN (HCC): ICD-10-CM

## 2019-12-23 DIAGNOSIS — Z79.4 UNCONTROLLED TYPE 2 DIABETES MELLITUS WITH HYPERGLYCEMIA, WITH LONG-TERM CURRENT USE OF INSULIN (HCC): ICD-10-CM

## 2019-12-26 ENCOUNTER — TELEPHONE (OUTPATIENT)
Dept: ENDOCRINOLOGY CLINIC | Facility: CLINIC | Age: 79
End: 2019-12-26

## 2019-12-26 DIAGNOSIS — Z79.4 TYPE 2 DIABETES MELLITUS WITHOUT COMPLICATION, WITH LONG-TERM CURRENT USE OF INSULIN (HCC): Primary | ICD-10-CM

## 2019-12-26 DIAGNOSIS — E11.9 TYPE 2 DIABETES MELLITUS WITHOUT COMPLICATION, WITH LONG-TERM CURRENT USE OF INSULIN (HCC): Primary | ICD-10-CM

## 2019-12-26 RX ORDER — INSULIN LISPRO 100 [IU]/ML
INJECTION, SOLUTION INTRAVENOUS; SUBCUTANEOUS
Qty: 3 ML | Refills: 0 | Status: SHIPPED | OUTPATIENT
Start: 2019-12-26 | End: 2020-01-06

## 2019-12-26 NOTE — TELEPHONE ENCOUNTER
Received fax from [facility], [employer] is formulary.  Pended humalog for your approval.

## 2020-01-04 DIAGNOSIS — I10 HYPERTENSION, ESSENTIAL, BENIGN: ICD-10-CM

## 2020-01-04 DIAGNOSIS — F34.1 DYSTHYMIA: ICD-10-CM

## 2020-01-04 RX ORDER — IRBESARTAN 300 MG/1
TABLET ORAL
Qty: 90 TABLET | Refills: 1 | Status: SHIPPED | OUTPATIENT
Start: 2020-01-04 | End: 2020-10-27

## 2020-01-06 ENCOUNTER — TELEPHONE (OUTPATIENT)
Dept: ENDOCRINOLOGY CLINIC | Facility: CLINIC | Age: 80
End: 2020-01-06

## 2020-01-06 ENCOUNTER — OFFICE VISIT (OUTPATIENT)
Dept: ENDOCRINOLOGY CLINIC | Facility: CLINIC | Age: 80
End: 2020-01-06
Payer: MEDICARE

## 2020-01-06 VITALS
HEART RATE: 78 BPM | WEIGHT: 123 LBS | SYSTOLIC BLOOD PRESSURE: 118 MMHG | BODY MASS INDEX: 24.15 KG/M2 | RESPIRATION RATE: 20 BRPM | DIASTOLIC BLOOD PRESSURE: 80 MMHG | HEIGHT: 60 IN

## 2020-01-06 DIAGNOSIS — E11.40 TYPE 2 DIABETES, UNCONTROLLED, WITH NEUROPATHY (HCC): ICD-10-CM

## 2020-01-06 DIAGNOSIS — E10.40 TYPE 1 DIABETES, UNCONTROLLED, WITH NEUROPATHY (HCC): Primary | ICD-10-CM

## 2020-01-06 DIAGNOSIS — E11.65 TYPE 2 DIABETES, UNCONTROLLED, WITH NEUROPATHY (HCC): ICD-10-CM

## 2020-01-06 DIAGNOSIS — E10.65 TYPE 1 DIABETES, UNCONTROLLED, WITH NEUROPATHY (HCC): Primary | ICD-10-CM

## 2020-01-06 LAB
CARTRIDGE LOT#: 588 NUMERIC
HEMOGLOBIN A1C: 6.6 % (ref 4.3–5.6)

## 2020-01-06 PROCEDURE — 95251 CONT GLUC MNTR ANALYSIS I&R: CPT | Performed by: NURSE PRACTITIONER

## 2020-01-06 PROCEDURE — 99214 OFFICE O/P EST MOD 30 MIN: CPT | Performed by: NURSE PRACTITIONER

## 2020-01-06 PROCEDURE — 83036 HEMOGLOBIN GLYCOSYLATED A1C: CPT | Performed by: NURSE PRACTITIONER

## 2020-01-06 RX ORDER — BUPROPION HYDROCHLORIDE 300 MG/1
TABLET ORAL
Qty: 90 TABLET | Refills: 0 | Status: SHIPPED | OUTPATIENT
Start: 2020-01-06 | End: 2020-03-16

## 2020-01-06 NOTE — TELEPHONE ENCOUNTER
Called pt and is aware that nect time she orders her supplies she will ask for the g6 that she will be approved for that. Pt verbalize understanding.

## 2020-01-06 NOTE — PATIENT INSTRUCTIONS
A1C: 6.6% (up from 6.2%)        Current DM Regimen:  Victoza: 1.8 mg once daily    Novolog Flex touch:   If blood sugar is above 200 : after 8pm : ok to take a dose of Novolog       If blood sugar is 201-250, take 2 units of insulin   If blood sugar is 251-

## 2020-01-06 NOTE — TELEPHONE ENCOUNTER
Called dexcom and they said next time pt needs supplies, she needs to call in and request for the upgrade to  G6   Please call pt to let her know about how to get the G6 w her next order

## 2020-01-06 NOTE — PROGRESS NOTES
Sobeida Beauchamp is a [de-identified]year old female presents today for diabetes management.    Primary care physician: Tena Mendieta MD     In the past 3m her DM control has remained well controlled: A1C 6.6%  (last A1C : 6.2%)   Surprised that her A1C wasn't higher ~ 01/06/2020    A1C 6.2 (A) 09/30/2019    A1C 7.0 (A) 06/26/2019     (H) 03/22/2019       Lab Results   Component Value Date    CHOLEST 146 03/22/2019    TRIG 52 03/22/2019    HDL 72 (H) 03/22/2019    LDL 64 03/22/2019    MICROALBCREA 17.9 03/22/2019 • HOSPITALIZATIONS     work up for TIA vs. stroke and was dx with migraines   • Hypertension    • IBS (irritable bowel syndrome)    • Irregular bowel habits    • Leaking of urine    • Nausea    • Night sweats    •  (normal spontaneous vaginal deliver quittin.6      Smokeless tobacco: Never Used    Alcohol use: Yes      Comment: occasionally    Drug use: No    Family History   Problem Relation Age of Onset   • Diabetes Father         Type 2 DM   • Cancer Son         skin cancer/melanoma   • Diabete Take 20 mg by mouth nightly. • hydrochlorothiazide 25 MG Oral Tab Take 1 tablet (25 mg total) by mouth daily. 30 tablet 0   • Verapamil HCl 120 MG Oral Tab Take 1 tablet (120 mg total) by mouth 2 (two) times daily.  270 tablet 1   • metFORMIN HCl  18 Tab 0     Review of Systems   Constitutional: Negative for fatigue and unexpected weight change. HENT: Negative for dental problem. Eyes: Negative for visual disturbance. Respiratory: Negative for cough and shortness of breath.     Cardiovascular: >250)   2 u if 201-250   3 u if 251-300   4 u if over 301 mg/dl     Continue site rotation   Reviewed hypoglycemia signs/symptoms, treatment using the Rule of 15' and when to call DM center  HM labs ordered. F/u 3m  but call sooner if any issues.        O

## 2020-01-07 ENCOUNTER — TELEPHONE (OUTPATIENT)
Dept: INTERNAL MEDICINE CLINIC | Facility: CLINIC | Age: 80
End: 2020-01-07

## 2020-01-07 DIAGNOSIS — E11.40 TYPE 2 DIABETES, UNCONTROLLED, WITH NEUROPATHY (HCC): ICD-10-CM

## 2020-01-07 DIAGNOSIS — E11.65 TYPE 2 DIABETES, UNCONTROLLED, WITH NEUROPATHY (HCC): ICD-10-CM

## 2020-01-07 RX ORDER — LIRAGLUTIDE 6 MG/ML
INJECTION SUBCUTANEOUS
Qty: 9 ML | Refills: 0 | OUTPATIENT
Start: 2020-01-07

## 2020-01-07 NOTE — TELEPHONE ENCOUNTER
Patient is going on a trip tomorrow morning for 60 days, and she will be 2 pens short of Victoza. Please advise.

## 2020-01-07 NOTE — TELEPHONE ENCOUNTER
Pt is requesting a transmitter for her dexcom 5 and also requesting patches for her belly. Pt would like a call back. Pt is leaving for a long vacation and would like today.   Please advise

## 2020-01-07 NOTE — TELEPHONE ENCOUNTER
Called pt back needs transmitter and to upgrade to g5 pt stated she is going on vacation for almost 3 months told her she needs to call dexcom again and get it over night it to [place] before her cruise pt stated she verbalize understanding.

## 2020-01-07 NOTE — TELEPHONE ENCOUNTER
Talked to pt. Requesting 2 Victoza pens. She is going out of town and vacation and needs the medication because she will be on a cruise not able to fill them at a pharmacy.       Informed patient the medication will be sent to her pharmacy and we informed t

## 2020-03-02 ENCOUNTER — TELEPHONE (OUTPATIENT)
Dept: ENDOCRINOLOGY CLINIC | Facility: CLINIC | Age: 80
End: 2020-03-02

## 2020-03-15 DIAGNOSIS — F34.1 DYSTHYMIA: ICD-10-CM

## 2020-03-16 RX ORDER — BUPROPION HYDROCHLORIDE 300 MG/1
TABLET ORAL
Qty: 90 TABLET | Refills: 0 | Status: SHIPPED | OUTPATIENT
Start: 2020-03-16 | End: 2020-09-23

## 2020-03-22 ENCOUNTER — MOBILE ENCOUNTER (OUTPATIENT)
Dept: INTERNAL MEDICINE CLINIC | Facility: CLINIC | Age: 80
End: 2020-03-22

## 2020-03-22 ENCOUNTER — TELEPHONE (OUTPATIENT)
Dept: INTERNAL MEDICINE CLINIC | Facility: CLINIC | Age: 80
End: 2020-03-22

## 2020-03-22 DIAGNOSIS — I10 HYPERTENSION, ESSENTIAL, BENIGN: ICD-10-CM

## 2020-03-22 DIAGNOSIS — E78.5 HYPERLIPIDEMIA LDL GOAL <100: ICD-10-CM

## 2020-03-22 DIAGNOSIS — IMO0002 TYPE 2 DIABETES, UNCONTROLLED, WITH NEUROPATHY: ICD-10-CM

## 2020-03-22 DIAGNOSIS — E11.40 TYPE 2 DIABETES, UNCONTROLLED, WITH NEUROPATHY (HCC): ICD-10-CM

## 2020-03-22 DIAGNOSIS — F34.1 DYSTHYMIA: ICD-10-CM

## 2020-03-22 DIAGNOSIS — E11.65 TYPE 2 DIABETES, UNCONTROLLED, WITH NEUROPATHY (HCC): ICD-10-CM

## 2020-03-22 RX ORDER — METOPROLOL SUCCINATE 25 MG/1
TABLET, EXTENDED RELEASE ORAL
Qty: 90 TABLET | Refills: 0 | OUTPATIENT
Start: 2020-03-22

## 2020-03-22 RX ORDER — FLUOXETINE HYDROCHLORIDE 20 MG/1
CAPSULE ORAL
Qty: 90 CAPSULE | Refills: 0 | OUTPATIENT
Start: 2020-03-22

## 2020-03-22 RX ORDER — ATORVASTATIN CALCIUM 40 MG/1
TABLET, FILM COATED ORAL
Qty: 90 TABLET | Refills: 0 | OUTPATIENT
Start: 2020-03-22

## 2020-03-23 DIAGNOSIS — F34.1 DYSTHYMIA: ICD-10-CM

## 2020-03-23 DIAGNOSIS — I10 HYPERTENSION, ESSENTIAL, BENIGN: ICD-10-CM

## 2020-03-23 DIAGNOSIS — E78.5 HYPERLIPIDEMIA LDL GOAL <100: ICD-10-CM

## 2020-03-23 RX ORDER — METOPROLOL SUCCINATE 25 MG/1
25 TABLET, EXTENDED RELEASE ORAL
Qty: 90 TABLET | Refills: 1 | Status: SHIPPED | OUTPATIENT
Start: 2020-03-23 | End: 2020-09-23

## 2020-03-23 RX ORDER — FLUOXETINE HYDROCHLORIDE 20 MG/1
CAPSULE ORAL
Qty: 90 CAPSULE | Refills: 1 | Status: SHIPPED | OUTPATIENT
Start: 2020-03-23 | End: 2020-11-13

## 2020-03-23 RX ORDER — ATORVASTATIN CALCIUM 40 MG/1
TABLET, FILM COATED ORAL
Qty: 90 TABLET | Refills: 1 | Status: SHIPPED | OUTPATIENT
Start: 2020-03-23 | End: 2020-11-14

## 2020-03-23 NOTE — TELEPHONE ENCOUNTER
[place] pharmacy called requesting refills of the following:  FLUOXETINE HCL 20 MG  ATORVASTATIN 40 MG   METOPROLOL SUCCINATE ER 25 MG    Please send to [place] on file on [address]

## 2020-04-09 DIAGNOSIS — Z79.4 TYPE 2 DIABETES MELLITUS WITHOUT COMPLICATION, WITH LONG-TERM CURRENT USE OF INSULIN (HCC): ICD-10-CM

## 2020-04-09 DIAGNOSIS — E11.9 TYPE 2 DIABETES MELLITUS WITHOUT COMPLICATION, WITH LONG-TERM CURRENT USE OF INSULIN (HCC): ICD-10-CM

## 2020-04-10 RX ORDER — INSULIN LISPRO 100 [IU]/ML
INJECTION, SOLUTION INTRAVENOUS; SUBCUTANEOUS
Qty: 3 ML | Refills: 0 | Status: SHIPPED | OUTPATIENT
Start: 2020-04-10 | End: 2020-04-13 | Stop reason: SDUPTHER

## 2020-04-17 ENCOUNTER — TELEPHONE (OUTPATIENT)
Dept: INTERNAL MEDICINE CLINIC | Facility: CLINIC | Age: 80
End: 2020-04-17

## 2020-04-17 NOTE — TELEPHONE ENCOUNTER
Patient called and asked if an order can be placed to test for antibodies in reference to coronavirus. See spouse's task too.

## 2020-04-21 DIAGNOSIS — E11.65 TYPE 2 DIABETES, UNCONTROLLED, WITH NEUROPATHY (HCC): ICD-10-CM

## 2020-04-21 DIAGNOSIS — E11.40 TYPE 2 DIABETES, UNCONTROLLED, WITH NEUROPATHY (HCC): ICD-10-CM

## 2020-04-22 ENCOUNTER — TELEPHONE (OUTPATIENT)
Dept: INTERNAL MEDICINE CLINIC | Facility: CLINIC | Age: 80
End: 2020-04-22

## 2020-04-22 NOTE — TELEPHONE ENCOUNTER
Patient called and requested a refill on Victoza to be sent to [address] [place] [address] on file.

## 2020-04-24 ENCOUNTER — TELEPHONE (OUTPATIENT)
Dept: INTERNAL MEDICINE CLINIC | Facility: CLINIC | Age: 80
End: 2020-04-24

## 2020-04-24 DIAGNOSIS — Z20.822 EXPOSURE TO COVID-19 VIRUS: Primary | ICD-10-CM

## 2020-04-24 NOTE — TELEPHONE ENCOUNTER
Spoke with pt she was on a cruise in January/Feb for several weeks experience COVID like symptoms and would like antibody testing. Ok per Dr. Deborah Tucker. Order faxed to [address] [place] [address] in [place] at [phone]. Pt aware.

## 2020-05-14 ENCOUNTER — TELEPHONE (OUTPATIENT)
Dept: ENDOCRINOLOGY CLINIC | Facility: CLINIC | Age: 80
End: 2020-05-14

## 2020-06-03 ENCOUNTER — TELEMEDICINE (OUTPATIENT)
Dept: ENDOCRINOLOGY CLINIC | Facility: CLINIC | Age: 80
End: 2020-06-03
Payer: MEDICARE

## 2020-06-03 DIAGNOSIS — E11.65 TYPE 2 DIABETES MELLITUS WITH HYPERGLYCEMIA, WITH LONG-TERM CURRENT USE OF INSULIN (HCC): Primary | ICD-10-CM

## 2020-06-03 DIAGNOSIS — Z79.4 TYPE 2 DIABETES MELLITUS WITH HYPERGLYCEMIA, WITH LONG-TERM CURRENT USE OF INSULIN (HCC): Primary | ICD-10-CM

## 2020-06-03 PROCEDURE — 99213 OFFICE O/P EST LOW 20 MIN: CPT | Performed by: NURSE PRACTITIONER

## 2020-06-03 PROCEDURE — 95251 CONT GLUC MNTR ANALYSIS I&R: CPT | Performed by: NURSE PRACTITIONER

## 2020-06-03 NOTE — PATIENT INSTRUCTIONS
Your trends are running higher  It may be related to the covid quarantine.    Continue Dexcom CGM  Restart Lantus at 4 units once daily     Continue:     Metformin : XR 500mg :2 tabs daily  Victoza 1.8mg once daily     NOVOLOG: ONLY dose if pre meal BG is a

## 2020-06-03 NOTE — PROGRESS NOTES
Due to COVID-19 ACTION PLAN, the patient's office visit was converted to a video visit. Time Spent:  16 min     Maia Barreto is a [de-identified]year old female presents today for diabetes management.    Primary care physician: Dominick Oleary MD     In the past 3 therapy   Sjogrens: eye : stable sxs (+ autoimmune)     Previous DM therapies:  Byetta 10mcg twice daily (hypoglycemia, + GI  )  MDI: +  Response to GLP   lantus :  hypoglycemia     Current DM Regimen:  Victoza: 1.8 mg once daily   Novolog Flex touc • Belching    • Blood in the stool    • Body piercing    • Change in hair    • Constipation    • Cough 4/6/2011   • Depression    • [facility] ([facility]    • Dizziness    • Encounter for long-term (current) use of insulin ([facility])    • Esophageal reflux SITE LEFT (CPT=19281)  1980s   •       x 3 (, , )   • OTHER SURGICAL HISTORY      bilat carpal tunnel surgery   • THYROIDECTOMY      In  - Total thyroidectomy for benign thyroid disease   • TONSILLECTOMY     • TOTAL KNEE REPLACEMENT atorvastatin 40 MG Oral Tab TAKE ONE TABLET BY MOUTH NIGHTLY 90 tablet 1   • Metoprolol Succinate ER 25 MG Oral Tablet 24 Hr Take 1 tablet (25 mg total) by mouth once daily.  90 tablet 1   • BUPROPION HCL ER, XL, 300 MG Oral Tablet 24 Hr TAKE ONE TABLET BY Vaginal Tab INSERT 1 TABLET (10 MCG) BY VAGINAL ROUTE TWICE WEEKLY 18 Tab 0   • VITAMIN D 2000 UNIT OR TABS 1 TABLET DAILY       DM associated review of  symptoms:   Endocrine: Polyuria, polyphagia, polydipsia: no  Neurological: Paresthesias: no  HEENT: Bl hypoglycemia signs/symptoms, treatment using the Rule of 15' and when to call DM center  HM labs ordered. F/u 6 weeks   but call sooner if any issues.        Orders Placed This Encounter      Comp Metabolic Panel (14) [E]          Standing Status: Future Appropriate medical decision-making and tests are ordered as detailed in the plan of care above. Haylie Clifton understands phone or video evaluation is not a substitute for face-to-face examination or emergency care.  Patient advised to go to ER or

## 2020-06-09 ENCOUNTER — APPOINTMENT (OUTPATIENT)
Dept: LAB | Age: 80
End: 2020-06-09
Attending: NURSE PRACTITIONER
Payer: MEDICARE

## 2020-06-09 DIAGNOSIS — E10.65 TYPE 1 DIABETES, UNCONTROLLED, WITH NEUROPATHY (HCC): ICD-10-CM

## 2020-06-09 DIAGNOSIS — Z79.4 TYPE 2 DIABETES MELLITUS WITH HYPERGLYCEMIA, WITH LONG-TERM CURRENT USE OF INSULIN (HCC): ICD-10-CM

## 2020-06-09 DIAGNOSIS — E11.65 TYPE 2 DIABETES MELLITUS WITH HYPERGLYCEMIA, WITH LONG-TERM CURRENT USE OF INSULIN (HCC): ICD-10-CM

## 2020-06-09 DIAGNOSIS — E10.40 TYPE 1 DIABETES, UNCONTROLLED, WITH NEUROPATHY (HCC): ICD-10-CM

## 2020-06-09 PROCEDURE — 84681 ASSAY OF C-PEPTIDE: CPT

## 2020-06-09 PROCEDURE — 84443 ASSAY THYROID STIM HORMONE: CPT

## 2020-06-09 PROCEDURE — 82043 UR ALBUMIN QUANTITATIVE: CPT

## 2020-06-09 PROCEDURE — 83036 HEMOGLOBIN GLYCOSYLATED A1C: CPT

## 2020-06-09 PROCEDURE — 82570 ASSAY OF URINE CREATININE: CPT

## 2020-06-09 PROCEDURE — 80053 COMPREHEN METABOLIC PANEL: CPT

## 2020-06-09 PROCEDURE — 36415 COLL VENOUS BLD VENIPUNCTURE: CPT

## 2020-06-09 PROCEDURE — 80061 LIPID PANEL: CPT

## 2020-06-15 ENCOUNTER — TELEPHONE (OUTPATIENT)
Dept: ENDOCRINOLOGY CLINIC | Facility: CLINIC | Age: 80
End: 2020-06-15

## 2020-06-15 NOTE — TELEPHONE ENCOUNTER
Faxed was receaved and I faxed over chart notes that were required. l confirmation sheet was received. Sending to scan.

## 2020-06-15 NOTE — TELEPHONE ENCOUNTER
Received fax from [facility] requesting documentation. Send to [employer] via email for completion.

## 2020-06-16 DIAGNOSIS — E89.0 POSTSURGICAL HYPOTHYROIDISM: ICD-10-CM

## 2020-06-16 RX ORDER — LEVOTHYROXINE SODIUM 0.12 MG/1
TABLET ORAL
Qty: 90 TABLET | Refills: 0 | Status: SHIPPED | OUTPATIENT
Start: 2020-06-16 | End: 2020-11-14

## 2020-06-19 ENCOUNTER — TELEPHONE (OUTPATIENT)
Dept: INTERNAL MEDICINE CLINIC | Facility: CLINIC | Age: 80
End: 2020-06-19

## 2020-06-19 DIAGNOSIS — E89.0 POSTSURGICAL HYPOTHYROIDISM: Primary | ICD-10-CM

## 2020-06-19 NOTE — TELEPHONE ENCOUNTER
Patient said her recent thyroid lab levels were off, and we had called in a new dose of her medication - however she wants to note that she takes biotin every day and wasn't sure if that is what contributed her her thyroid levels being different.  Please c/

## 2020-06-19 NOTE — TELEPHONE ENCOUNTER
Per Dr. Cj Ng stop biotin for 2 weeks, recheck TSH in 2 weeks continue current dose. LMOVM TCOB. Order pending. Discussed above with pt she expressed understanding    Order placed.

## 2020-06-25 ENCOUNTER — OFFICE VISIT (OUTPATIENT)
Dept: RHEUMATOLOGY | Facility: CLINIC | Age: 80
End: 2020-06-25
Payer: MEDICARE

## 2020-06-25 VITALS
BODY MASS INDEX: 23.56 KG/M2 | HEART RATE: 84 BPM | SYSTOLIC BLOOD PRESSURE: 120 MMHG | RESPIRATION RATE: 18 BRPM | DIASTOLIC BLOOD PRESSURE: 70 MMHG | HEIGHT: 60 IN | TEMPERATURE: 99 F | WEIGHT: 120 LBS

## 2020-06-25 DIAGNOSIS — G43.909 MIGRAINE SYNDROME: ICD-10-CM

## 2020-06-25 DIAGNOSIS — M35.01 SJOGREN'S SYNDROME WITH KERATOCONJUNCTIVITIS SICCA (HCC): Primary | ICD-10-CM

## 2020-06-25 DIAGNOSIS — M79.7 FIBROMYALGIA: Chronic | ICD-10-CM

## 2020-06-25 DIAGNOSIS — M81.0 AGE-RELATED OSTEOPOROSIS WITHOUT CURRENT PATHOLOGICAL FRACTURE: ICD-10-CM

## 2020-06-25 PROCEDURE — 99213 OFFICE O/P EST LOW 20 MIN: CPT | Performed by: INTERNAL MEDICINE

## 2020-06-25 RX ORDER — HYDROCODONE BITARTRATE AND ACETAMINOPHEN 10; 325 MG/1; MG/1
1 TABLET ORAL EVERY 6 HOURS PRN
Qty: 120 TABLET | Refills: 0 | Status: SHIPPED | OUTPATIENT
Start: 2020-08-25 | End: 2020-08-12

## 2020-06-25 RX ORDER — HYDROCODONE BITARTRATE AND ACETAMINOPHEN 10; 325 MG/1; MG/1
1 TABLET ORAL EVERY 6 HOURS PRN
Qty: 120 TABLET | Refills: 0 | Status: SHIPPED | OUTPATIENT
Start: 2020-07-25 | End: 2020-08-12

## 2020-06-25 RX ORDER — HYDROCODONE BITARTRATE AND ACETAMINOPHEN 10; 325 MG/1; MG/1
1 TABLET ORAL EVERY 6 HOURS PRN
Qty: 120 TABLET | Refills: 0 | Status: SHIPPED | OUTPATIENT
Start: 2020-06-25 | End: 2020-10-09

## 2020-06-25 NOTE — PATIENT INSTRUCTIONS
Sjogren's syndrome, drink plenty of water, use artificial tears as needed, use Biotene over-the-counter products as needed. Especially use of mild spray if it helps.   For arthritis pain take extra strength Tylenol 2 tablets twice a day if there is severe

## 2020-06-25 NOTE — PROGRESS NOTES
[facility]  Dr. Neha William Progress Note     Subjective:   Rusty Benoit is a(n) [de-identified]year old female. Current complaints: Patient presents with:  Sjogren's Syndrome: 6 month f/u (Delayed due to Covid 19)   Using a mouth spray for dry mouth.   Joint pa

## 2020-07-05 DIAGNOSIS — I10 HYPERTENSION, ESSENTIAL, BENIGN: ICD-10-CM

## 2020-07-05 DIAGNOSIS — E89.0 POSTSURGICAL HYPOTHYROIDISM: ICD-10-CM

## 2020-07-05 RX ORDER — METOPROLOL SUCCINATE 25 MG/1
TABLET, EXTENDED RELEASE ORAL
Qty: 90 TABLET | Refills: 0 | OUTPATIENT
Start: 2020-07-05

## 2020-07-06 ENCOUNTER — TELEPHONE (OUTPATIENT)
Dept: INTERNAL MEDICINE CLINIC | Facility: CLINIC | Age: 80
End: 2020-07-06

## 2020-07-06 DIAGNOSIS — Z11.59 SCREENING FOR MEASLES: Primary | ICD-10-CM

## 2020-07-06 RX ORDER — LEVOTHYROXINE SODIUM 0.12 MG/1
TABLET ORAL
Qty: 90 TABLET | Refills: 0 | OUTPATIENT
Start: 2020-07-06

## 2020-07-06 NOTE — TELEPHONE ENCOUNTER
Pt states never having had MMR vaccine due to age, jamila ordered prior to vaccine administration. Ok per brooke to order lab work prior to vaccine use.

## 2020-07-15 ENCOUNTER — TELEMEDICINE (OUTPATIENT)
Dept: ENDOCRINOLOGY CLINIC | Facility: CLINIC | Age: 80
End: 2020-07-15
Payer: MEDICARE

## 2020-07-15 DIAGNOSIS — E11.65 TYPE 2 DIABETES MELLITUS WITH HYPERGLYCEMIA, WITH LONG-TERM CURRENT USE OF INSULIN (HCC): Primary | ICD-10-CM

## 2020-07-15 DIAGNOSIS — Z79.4 TYPE 2 DIABETES MELLITUS WITH HYPERGLYCEMIA, WITH LONG-TERM CURRENT USE OF INSULIN (HCC): Primary | ICD-10-CM

## 2020-07-15 PROCEDURE — 99213 OFFICE O/P EST LOW 20 MIN: CPT | Performed by: NURSE PRACTITIONER

## 2020-07-15 PROCEDURE — 95251 CONT GLUC MNTR ANALYSIS I&R: CPT | Performed by: NURSE PRACTITIONER

## 2020-07-15 NOTE — PROGRESS NOTES
Due to COVID-19 ACTION PLAN, the patient's office visit was converted to a phone or video visit. Time Spent:  16 min     Malcom Wahl is a [de-identified]year old female presents today for diabetes management.    Primary care physician: Hanna George MD   In the therapies:  Byetta 10mcg twice daily (hypoglycemia, + GI  )  MDI: +  Response to GLP   lantus :  hypoglycemia     Current DM Regimen:  Victoza: 1.8 mg once daily   Novolog Flex touch:   200-250, 2 u   251-300, 3 u   301-350, 4 u   lantus 4 units onc hair    • Constipation    • Cough 4/6/2011   • Depression    • Diabetes mellitus ([facility]    • Dizziness    • Encounter for long-term (current) use of insulin ([facility])    • Esophageal reflux    • Excessive bleeding    • Fibromyalgia    • Flatulence/gas pain/belc OTHER SURGICAL HISTORY      bilat carpal tunnel surgery   • THYROIDECTOMY      In 1984 - Total thyroidectomy for benign thyroid disease   • TONSILLECTOMY     • TOTAL KNEE REPLACEMENT      ROBERT     Social History    Tobacco Use      Smoking status: Former Sm 9 mL 5   • hyoscyamine sulfate 0.125 MG Oral Tablet Dispersible Place 1-2 tablets (0.125-0.25 mg total) under the tongue every 4 to 6 hours as needed. 30 tablet 3   • Pantoprazole Sodium 40 MG Oral Tab EC Take 1 tablet (40 mg total) by mouth daily.  Take on per day 400 strip 1   • Glucose Blood (ONETOUCH ULTRA BLUE) In Vitro Strip Check blood sugars 3 times per day.  Dx: Type 2 DM (E11.65) on insulin 300 strip 1   • SF 1.1 % Dental Gel   5   • ONETOUCH DELICA LANCETS Does not apply Misc For insulin use 3 times complications.   Continue Dexcom  G6   Lantus solostar: 4--> 3  units once daily   Continue:   Metformin : XR 500mg :2 tabs daily  Victoza 1.8mg once daily     NOVOLOG: ONLY dose if blood sugar is over 200   Check blood sugar before the meal: or 2 hours aft Edenilson Jurist, APRN

## 2020-07-28 ENCOUNTER — TELEPHONE (OUTPATIENT)
Dept: ENDOCRINOLOGY CLINIC | Facility: CLINIC | Age: 80
End: 2020-07-28

## 2020-07-28 NOTE — TELEPHONE ENCOUNTER
Patient called today saying she received a letter from [facility] stating they will no longer be distributing supplies for the Dexcom. The letter had a list of DME companies she could contact to set up her order through them.  I have reached out to Terrence Butler and

## 2020-07-28 NOTE — TELEPHONE ENCOUNTER
insulin glargine (LANTUS SOLOSTAR) 100 UNIT/ML Subcutaneous Solution Pen-injector 15 mL 0 6/3/2020    Si units daily as directed       [address] [facility], [phone]

## 2020-08-02 DIAGNOSIS — E89.0 POSTSURGICAL HYPOTHYROIDISM: ICD-10-CM

## 2020-08-03 ENCOUNTER — TELEPHONE (OUTPATIENT)
Dept: ENDOCRINOLOGY CLINIC | Facility: CLINIC | Age: 80
End: 2020-08-03

## 2020-08-03 RX ORDER — LEVOTHYROXINE SODIUM 0.12 MG/1
TABLET ORAL
Qty: 90 TABLET | Refills: 0 | OUTPATIENT
Start: 2020-08-03

## 2020-08-03 NOTE — TELEPHONE ENCOUNTER
Received fax from [employer] for detail writtenorder to be signed. CAB signed and it was faxed. Confirmation sheet was received.

## 2020-08-04 ENCOUNTER — APPOINTMENT (OUTPATIENT)
Dept: LAB | Age: 80
End: 2020-08-04
Attending: INTERNAL MEDICINE
Payer: MEDICARE

## 2020-08-04 ENCOUNTER — APPOINTMENT (OUTPATIENT)
Dept: LAB | Age: 80
End: 2020-08-04
Attending: NURSE PRACTITIONER
Payer: MEDICARE

## 2020-08-04 DIAGNOSIS — E89.0 POSTSURGICAL HYPOTHYROIDISM: ICD-10-CM

## 2020-08-04 DIAGNOSIS — Z11.59 SCREENING FOR MEASLES: ICD-10-CM

## 2020-08-04 DIAGNOSIS — E03.9 HYPOTHYROIDISM, UNSPECIFIED TYPE: ICD-10-CM

## 2020-08-04 LAB
RUBV IGG SER QL: POSITIVE
RUBV IGG SER-ACNC: >500 IU/ML (ref 10–?)
TSI SER-ACNC: 0.64 MIU/ML (ref 0.36–3.74)

## 2020-08-04 PROCEDURE — 36415 COLL VENOUS BLD VENIPUNCTURE: CPT

## 2020-08-04 PROCEDURE — 86735 MUMPS ANTIBODY: CPT

## 2020-08-04 PROCEDURE — 84443 ASSAY THYROID STIM HORMONE: CPT

## 2020-08-04 PROCEDURE — 86762 RUBELLA ANTIBODY: CPT

## 2020-08-04 PROCEDURE — 86765 RUBEOLA ANTIBODY: CPT

## 2020-08-05 LAB
MEV IGG SER-ACNC: >300 AU/ML (ref 16.5–?)
MUV IGG SER IA-ACNC: 136 AU/ML (ref 11–?)

## 2020-08-12 ENCOUNTER — OFFICE VISIT (OUTPATIENT)
Dept: INTERNAL MEDICINE CLINIC | Facility: CLINIC | Age: 80
End: 2020-08-12
Payer: MEDICARE

## 2020-08-12 ENCOUNTER — TELEPHONE (OUTPATIENT)
Dept: ENDOCRINOLOGY CLINIC | Facility: CLINIC | Age: 80
End: 2020-08-12

## 2020-08-12 VITALS
RESPIRATION RATE: 18 BRPM | SYSTOLIC BLOOD PRESSURE: 124 MMHG | WEIGHT: 124 LBS | HEART RATE: 78 BPM | HEIGHT: 60 IN | DIASTOLIC BLOOD PRESSURE: 82 MMHG | OXYGEN SATURATION: 97 % | TEMPERATURE: 99 F | BODY MASS INDEX: 24.35 KG/M2

## 2020-08-12 DIAGNOSIS — B35.1 ONYCHOMYCOSIS: Primary | ICD-10-CM

## 2020-08-12 PROCEDURE — 99213 OFFICE O/P EST LOW 20 MIN: CPT | Performed by: NURSE PRACTITIONER

## 2020-08-12 RX ORDER — TERBINAFINE HYDROCHLORIDE 250 MG/1
250 TABLET ORAL DAILY
Qty: 45 TABLET | Refills: 0 | Status: SHIPPED | OUTPATIENT
Start: 2020-08-12 | End: 2020-10-09

## 2020-08-12 NOTE — PROGRESS NOTES
HPI:    Patient ID: Sherice Singh is a [de-identified]year old female.     Patient presents with:  Nail Care: x5-6 weeks: pt went to get a manicure and had discoloration on her nail, was told to see PCP if it didn't go away      Noted nail change on left hand ring f thyroidectomy for benign thyroid disease   • Presence of other cardiac implants and grafts     knees   • Skin abscess 5/1/2012   • Sleep apnea    • Sleep disturbance    • Traumatic ecchymosis of face 8/4/2014    On 8/1/2014 - she fell on some stairs and de Use      Smoking status: Former Smoker        Packs/day: 1.00        Years: 8.00        Pack years: 8        Quit date: 1967        Years since quittin.2      Smokeless tobacco: Never Used    Substance and Sexual Activity      Alcohol use:  Yes TABLET BY MOUTH ONCE DAILY  90 tablet 0   • IRBESARTAN 300 MG Oral Tab TAKE ONE TABLET BY MOUTH ONCE DAILY  90 tablet 1   • Verapamil HCl 120 MG Oral Tab Take 1 tablet (120 mg total) by mouth 2 (two) times daily.  180 tablet 1   • hydrochlorothiazide 25 MG DYE AS A PRECAUTION.   Nasalcrom               RASH    Comment:\"AERS\"  Viibryd                 OTHER (SEE COMMENTS)    Comment:Intermittent ear popping, itching, and bad dreams  Vilazodone              OTHER (SEE COMMENTS)  Zelnorm [Tegaserod *    UNKNOWN kg/m²   Physical Exam   Nursing note and vitals reviewed. Constitutional: She is oriented to person, place, and time. She appears well-developed and well-nourished. Cardiovascular: Normal rate, regular rhythm and normal heart sounds.     Pulmonary/Chest

## 2020-08-12 NOTE — TELEPHONE ENCOUNTER
Dexcom sent patient's Dexcom supply order to [facility] since they are no longer supplying Medicare patients. Pt is out of supplies and received an email from [facility] stating they would call her to confirm secondary insurance. She has not received call.  I tried maged

## 2020-08-19 ENCOUNTER — TELEMEDICINE (OUTPATIENT)
Dept: ENDOCRINOLOGY CLINIC | Facility: CLINIC | Age: 80
End: 2020-08-19
Payer: MEDICARE
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
## 2020-08-19 NOTE — PATIENT INSTRUCTIONS
Your trends are higher   Continue  Metformin 1 tab twice daily   Victoza: 1.8 mg once daily   lantus 3 units once daily    Start dosing NOVOLOG 2 units with breakfast and dinner

## 2020-08-31 ENCOUNTER — TELEPHONE (OUTPATIENT)
Dept: ENDOCRINOLOGY CLINIC | Facility: CLINIC | Age: 80
End: 2020-08-31

## 2020-08-31 NOTE — TELEPHONE ENCOUNTER
Received fax from [facility]  requesting last OV note be faxed to [phone]. Telehealth note from 8/19/20 and signed request faxed confirmation received.

## 2020-09-08 ENCOUNTER — OFFICE VISIT (OUTPATIENT)
Dept: ENDOCRINOLOGY CLINIC | Facility: CLINIC | Age: 80
End: 2020-09-08
Payer: MEDICARE

## 2020-09-08 VITALS
SYSTOLIC BLOOD PRESSURE: 136 MMHG | WEIGHT: 127 LBS | RESPIRATION RATE: 18 BRPM | HEIGHT: 60 IN | BODY MASS INDEX: 24.94 KG/M2 | DIASTOLIC BLOOD PRESSURE: 82 MMHG | HEART RATE: 76 BPM

## 2020-09-08 DIAGNOSIS — E10.40 TYPE 1 DIABETES, UNCONTROLLED, WITH NEUROPATHY (HCC): Primary | ICD-10-CM

## 2020-09-08 DIAGNOSIS — E11.65 UNCONTROLLED TYPE 2 DIABETES MELLITUS WITH HYPERGLYCEMIA, WITH LONG-TERM CURRENT USE OF INSULIN (HCC): ICD-10-CM

## 2020-09-08 DIAGNOSIS — E10.65 TYPE 1 DIABETES, UNCONTROLLED, WITH NEUROPATHY (HCC): Primary | ICD-10-CM

## 2020-09-08 DIAGNOSIS — Z79.4 LONG-TERM INSULIN USE (HCC): ICD-10-CM

## 2020-09-08 DIAGNOSIS — Z79.4 UNCONTROLLED TYPE 2 DIABETES MELLITUS WITH HYPERGLYCEMIA, WITH LONG-TERM CURRENT USE OF INSULIN (HCC): ICD-10-CM

## 2020-09-08 PROCEDURE — 95251 CONT GLUC MNTR ANALYSIS I&R: CPT | Performed by: NURSE PRACTITIONER

## 2020-09-08 PROCEDURE — 99214 OFFICE O/P EST MOD 30 MIN: CPT | Performed by: NURSE PRACTITIONER

## 2020-09-08 NOTE — PATIENT INSTRUCTIONS
A1C 7.7%     Continue:   Victoza: 1.8 mg once daily     Hold Metformin     Increase Lantus to 4 units once daily    Novolog Flex touch:3 units before meals (B/D)    On days when you eat cereal : take 4 units NOVOLOG  with breakfast     Before bedtime:

## 2020-09-08 NOTE — PROGRESS NOTES
Josie Morales is a [de-identified]year old female presents today for diabetes management.    Primary care physician: Siena Callejas MD   In the past 3m her DM Control has increased to 7.7%  ( last A1C 7.5%)   She self adjusted her basal and bolus by 1 u since she was +1 u   301-350, add + 2 u   Over 251, add + 3 u          HGBA1C:    Lab Results   Component Value Date    A1C 7.5 (H) 06/09/2020    A1C 6.6 (A) 01/06/2020    A1C 6.2 (A) 09/30/2019     (H) 06/09/2020       Lab Results   Component Value Date    MARS Fibromyalgia    • Flatulence/gas pain/belching    • HEADACHES     migraines   • Hearing loss    • Hemorrhoids    • High cholesterol    • History of depression    • HOSPITALIZATIONS     work up for TIA vs. stroke and was dx with migraines   • Hypertension Tobacco Use      Smoking status: Former Smoker        Packs/day: 1.00        Years: 8.00        Pack years: 8        Quit date: 1967        Years since quittin.2      Smokeless tobacco: Never Used    Alcohol use: Yes      Comment: occasionally tablet 3   • Insulin Aspart Pen (NOVOLOG FLEXPEN) 100 UNIT/ML Subcutaneous Solution Pen-injector INJECT Up to 10 units daily as directed 3 mL 0   • FLUoxetine HCl 20 MG Oral Cap TAKE ONE CAPSULE BY MOUTH ONCE DAILY 90 capsule 1   • atorvastatin 40 MG Oral swelling. Gastrointestinal: Positive for constipation. (transient) Negative for nausea and diarrhea. Musculoskeletal: Negative for joint pain. + foot pain w bunions   Psychiatric/Behavioral: Negative for sleep disturbance.        Physical exam:   above  Retinopathy Screen:  NO DR in media   Nephropathy Screen: 6-2020  NEG   Continue Arb  Depression Screen:  PHQ-2 NEG 3-2019   Feet exam: Reviewed feet precautions: foot exam PCP office 8-2020    BP: as above  Lipids: labs 6-2020  labs/ taking

## 2020-09-20 DIAGNOSIS — E11.65 TYPE 2 DIABETES, UNCONTROLLED, WITH NEUROPATHY (HCC): ICD-10-CM

## 2020-09-20 DIAGNOSIS — E11.40 TYPE 2 DIABETES, UNCONTROLLED, WITH NEUROPATHY (HCC): ICD-10-CM

## 2020-09-21 RX ORDER — LIRAGLUTIDE 6 MG/ML
INJECTION SUBCUTANEOUS
Qty: 9 ML | Refills: 0 | Status: SHIPPED | OUTPATIENT
Start: 2020-09-21 | End: 2020-11-09

## 2020-09-22 ENCOUNTER — TELEPHONE (OUTPATIENT)
Dept: ENDOCRINOLOGY CLINIC | Facility: CLINIC | Age: 80
End: 2020-09-22

## 2020-09-22 NOTE — TELEPHONE ENCOUNTER
LM with insulin adjustments, also sent her a My CHart message. Asked her to please have Dexcom downloaded in 4 weeks again.

## 2020-09-22 NOTE — TELEPHONE ENCOUNTER
CGM Analysis of data: 9-9-2020 thru 9-   Sensor download: full report in media  Average glucose: 197 mg/dl ( last download: 194 mg/dl)          Standard deviation: 439 mg/dl   GMI: estimated A1C : n/a          60.2% time above 180mg /dl ( last downl

## 2020-09-23 DIAGNOSIS — I10 HYPERTENSION, ESSENTIAL, BENIGN: ICD-10-CM

## 2020-09-23 DIAGNOSIS — F34.1 DYSTHYMIA: ICD-10-CM

## 2020-09-23 RX ORDER — METOPROLOL SUCCINATE 25 MG/1
TABLET, EXTENDED RELEASE ORAL
Qty: 90 TABLET | Refills: 0 | Status: SHIPPED | OUTPATIENT
Start: 2020-09-23 | End: 2020-11-14

## 2020-09-23 RX ORDER — VERAPAMIL HYDROCHLORIDE 120 MG/1
TABLET, FILM COATED ORAL
Qty: 270 TABLET | Refills: 0 | Status: SHIPPED | OUTPATIENT
Start: 2020-09-23 | End: 2020-11-06

## 2020-09-23 RX ORDER — BUPROPION HYDROCHLORIDE 300 MG/1
TABLET ORAL
Qty: 90 TABLET | Refills: 0 | Status: SHIPPED | OUTPATIENT
Start: 2020-09-23 | End: 2020-11-14

## 2020-10-09 ENCOUNTER — OFFICE VISIT (OUTPATIENT)
Dept: INTERNAL MEDICINE CLINIC | Facility: CLINIC | Age: 80
End: 2020-10-09
Payer: MEDICARE

## 2020-10-09 VITALS
OXYGEN SATURATION: 97 % | HEART RATE: 75 BPM | WEIGHT: 125 LBS | SYSTOLIC BLOOD PRESSURE: 132 MMHG | BODY MASS INDEX: 23.6 KG/M2 | RESPIRATION RATE: 16 BRPM | DIASTOLIC BLOOD PRESSURE: 88 MMHG | TEMPERATURE: 98 F | HEIGHT: 61 IN

## 2020-10-09 DIAGNOSIS — Z79.4 TYPE 2 DIABETES MELLITUS WITHOUT COMPLICATION, WITH LONG-TERM CURRENT USE OF INSULIN (HCC): ICD-10-CM

## 2020-10-09 DIAGNOSIS — Z00.00 ANNUAL PHYSICAL EXAM: Primary | ICD-10-CM

## 2020-10-09 DIAGNOSIS — M81.0 AGE-RELATED OSTEOPOROSIS WITHOUT CURRENT PATHOLOGICAL FRACTURE: ICD-10-CM

## 2020-10-09 DIAGNOSIS — F51.02 INSOMNIA DUE TO STRESS: ICD-10-CM

## 2020-10-09 DIAGNOSIS — G47.33 OSA ON CPAP: ICD-10-CM

## 2020-10-09 DIAGNOSIS — G43.909 MIGRAINE SYNDROME: ICD-10-CM

## 2020-10-09 DIAGNOSIS — E89.0 POSTSURGICAL HYPOTHYROIDISM: ICD-10-CM

## 2020-10-09 DIAGNOSIS — E11.9 TYPE 2 DIABETES MELLITUS WITHOUT COMPLICATION, WITH LONG-TERM CURRENT USE OF INSULIN (HCC): ICD-10-CM

## 2020-10-09 DIAGNOSIS — Z12.11 COLON CANCER SCREENING: ICD-10-CM

## 2020-10-09 DIAGNOSIS — I10 HYPERTENSION, ESSENTIAL, BENIGN: ICD-10-CM

## 2020-10-09 DIAGNOSIS — R79.89 LOW SERUM C-PEPTIDE: ICD-10-CM

## 2020-10-09 DIAGNOSIS — M79.7 FIBROMYALGIA: Chronic | ICD-10-CM

## 2020-10-09 DIAGNOSIS — M35.01 SJOGREN'S SYNDROME WITH KERATOCONJUNCTIVITIS SICCA (HCC): ICD-10-CM

## 2020-10-09 DIAGNOSIS — Z00.00 ENCOUNTER FOR ANNUAL HEALTH EXAMINATION: ICD-10-CM

## 2020-10-09 DIAGNOSIS — Z99.89 OSA ON CPAP: ICD-10-CM

## 2020-10-09 PROCEDURE — G0439 PPPS, SUBSEQ VISIT: HCPCS | Performed by: INTERNAL MEDICINE

## 2020-10-09 NOTE — PATIENT INSTRUCTIONS
Diabetes: Activity Tips    Being more active can help you manage your diabetes. The tips on this sheet can help you get the most from your exercise. They can also help you stay safe.    Staying active   It’s important for adults to spend less time sitting You may be told to plan your exercise for 1 to 2 hours after a meal. In most cases, you don’t need to eat while being active. Test your blood sugar before exercising if you take insulin or medicine that can cause low blood sugar.  And carry a fast-acting russo Tests on this list are recommended by your physician but may not be covered, or covered at this frequency, by your insurer. Please check with your insurance carrier before scheduling to verify coverage.    PREVENTATIVE SERVICES  INDICATIONS AND SCHEDULE Int Abdominal aortic aneurysm screening (once between ages 73-68) IPPE only No results found for this or any previous visit.  Limited to patients who meet one of the following criteria:   • Men who are 73-68 years old and have smoked more than 100 cigarettes i Screening Mammogram      Mammogram    Recommend Annually to at least age 76, and as needed after 76 There are no preventive care reminders to display for this patient.  Please get this Mammogram regularly   Immunizations      Influenza  Covered Annually Cesar gautam An information packet, including necessary form from the [employer] website. http://[email or web address] [place].state. [place]/public/books/advin.htm  A link to the [employer].  This site has a lot of good information including definit

## 2020-10-16 DIAGNOSIS — K21.9 GASTROESOPHAGEAL REFLUX DISEASE, UNSPECIFIED WHETHER ESOPHAGITIS PRESENT: Primary | ICD-10-CM

## 2020-10-16 RX ORDER — PANTOPRAZOLE SODIUM 40 MG/1
TABLET, DELAYED RELEASE ORAL
Qty: 180 TABLET | Refills: 3 | OUTPATIENT
Start: 2020-10-16

## 2020-10-22 ENCOUNTER — OFFICE VISIT (OUTPATIENT)
Dept: INTERNAL MEDICINE CLINIC | Facility: CLINIC | Age: 80
End: 2020-10-22
Payer: MEDICARE

## 2020-10-22 VITALS
WEIGHT: 127 LBS | TEMPERATURE: 97 F | HEIGHT: 61 IN | DIASTOLIC BLOOD PRESSURE: 84 MMHG | HEART RATE: 82 BPM | OXYGEN SATURATION: 96 % | SYSTOLIC BLOOD PRESSURE: 136 MMHG | RESPIRATION RATE: 16 BRPM | BODY MASS INDEX: 23.98 KG/M2

## 2020-10-22 DIAGNOSIS — I20.8 ATYPICAL ANGINA (HCC): ICD-10-CM

## 2020-10-22 DIAGNOSIS — M54.16 LUMBAR RADICULOPATHY: Primary | ICD-10-CM

## 2020-10-22 PROCEDURE — 99214 OFFICE O/P EST MOD 30 MIN: CPT | Performed by: INTERNAL MEDICINE

## 2020-10-22 NOTE — PROGRESS NOTES
HPI:    Patient ID: Dav Foster is a [de-identified]year old female. Back Pain    Gastro-esophageal Reflux      HPI:   Dav Foster is a [de-identified]year old female who is here for complaints of back pain. Pain is located at left low back.  Pain is described as sha (0.125-0.25 mg total) under the tongue every 4 to 6 hours as needed. 30 tablet 3   • Pantoprazole Sodium 40 MG Oral Tab EC Take 1 tablet (40 mg total) by mouth daily.  Take one tablet (40 mg total) by mouth once daily, 30 minutes prior to breakfast. (Berta insulin (HCC)    • Esophageal reflux    • Excessive bleeding    • Fibromyalgia    • Flatulence/gas pain/belching    • HEADACHES     migraines   • Hearing loss    • Hemorrhoids    • High cholesterol    • History of depression    • HOSPITALIZATIONS     work TONSILLECTOMY     • TOTAL KNEE REPLACEMENT      ROBERT       Family History   Problem Relation Age of Onset   • Diabetes Father         Type 2 DM   • Cancer Son         skin cancer/melanoma   • Diabetes Sister         One sister with Type 2 DM   • Breast Canc Radiculopathy. I am concerned the pt has an anginal equivalent /atypical angina due to her hx of dm2. Stop double therapy with PPI.  Can continue omeprazole and use of tums prn    PLAN:   physical therapy, no lifting, pushing or pulling, MRI of the aff 40 mg by mouth 2 (two) times daily before meals.  ) 90 tablet 3   • FLUoxetine HCl 20 MG Oral Cap TAKE ONE CAPSULE BY MOUTH ONCE DAILY 90 capsule 1   • atorvastatin 40 MG Oral Tab TAKE ONE TABLET BY MOUTH NIGHTLY 90 tablet 1   • IRBESARTAN 300 MG Oral Tab Comment:Intermittent ear popping, itching, and bad dreams  Vilazodone              OTHER (SEE COMMENTS)  Zelnorm [Tegaserod *    UNKNOWN    Comment:\"TAB\":   PHYSICAL EXAM:   Physical Exam           ASSESSMENT/PLAN:   Lumbar radiculopathy  (primary encoun

## 2020-10-24 ENCOUNTER — MED REC SCAN ONLY (OUTPATIENT)
Dept: INTERNAL MEDICINE CLINIC | Facility: CLINIC | Age: 80
End: 2020-10-24

## 2020-10-26 ENCOUNTER — TELEPHONE (OUTPATIENT)
Dept: INTERNAL MEDICINE CLINIC | Facility: CLINIC | Age: 80
End: 2020-10-26

## 2020-10-26 NOTE — TELEPHONE ENCOUNTER
Mammogram results in [place] from [facility] Amauri Anderson. Dr. Navya Sheth reviewed and negative for malignancy. D/w pt she expressed understanding.

## 2020-10-27 ENCOUNTER — TELEPHONE (OUTPATIENT)
Dept: INTERNAL MEDICINE CLINIC | Facility: CLINIC | Age: 80
End: 2020-10-27

## 2020-10-27 DIAGNOSIS — I10 HYPERTENSION, ESSENTIAL, BENIGN: ICD-10-CM

## 2020-10-27 RX ORDER — IRBESARTAN 300 MG/1
TABLET ORAL
Qty: 90 TABLET | Refills: 1 | Status: SHIPPED | OUTPATIENT
Start: 2020-10-27 | End: 2020-11-14

## 2020-11-02 ENCOUNTER — HOSPITAL ENCOUNTER (OUTPATIENT)
Dept: MRI IMAGING | Age: 80
Discharge: HOME | End: 2020-11-02
Attending: INTERNAL MEDICINE
Payer: MEDICARE

## 2020-11-02 ENCOUNTER — HOSPITAL ENCOUNTER (OUTPATIENT)
Dept: CV DIAGNOSTICS | Age: 80
Discharge: HOME | End: 2020-11-02
Attending: INTERNAL MEDICINE
Payer: MEDICARE

## 2020-11-02 DIAGNOSIS — M54.16 LUMBAR RADICULOPATHY: ICD-10-CM

## 2020-11-02 DIAGNOSIS — I20.8 ATYPICAL ANGINA (HCC): ICD-10-CM

## 2020-11-02 PROCEDURE — 93018 CV STRESS TEST I&R ONLY: CPT | Performed by: INTERNAL MEDICINE

## 2020-11-02 PROCEDURE — 72148 MRI LUMBAR SPINE W/O DYE: CPT | Performed by: INTERNAL MEDICINE

## 2020-11-02 PROCEDURE — 93017 CV STRESS TEST TRACING ONLY: CPT | Performed by: INTERNAL MEDICINE

## 2020-11-02 PROCEDURE — 78452 HT MUSCLE IMAGE SPECT MULT: CPT | Performed by: INTERNAL MEDICINE

## 2020-11-03 ENCOUNTER — OFFICE VISIT (OUTPATIENT)
Dept: RHEUMATOLOGY | Facility: CLINIC | Age: 80
End: 2020-11-03
Payer: MEDICARE

## 2020-11-03 VITALS
WEIGHT: 123 LBS | SYSTOLIC BLOOD PRESSURE: 126 MMHG | OXYGEN SATURATION: 96 % | TEMPERATURE: 98 F | HEART RATE: 82 BPM | BODY MASS INDEX: 24.15 KG/M2 | HEIGHT: 60 IN | DIASTOLIC BLOOD PRESSURE: 74 MMHG

## 2020-11-03 DIAGNOSIS — Z79.4 LONG-TERM INSULIN USE (HCC): ICD-10-CM

## 2020-11-03 DIAGNOSIS — M79.7 FIBROMYALGIA: Chronic | ICD-10-CM

## 2020-11-03 DIAGNOSIS — E11.65 UNCONTROLLED TYPE 2 DIABETES MELLITUS WITH HYPERGLYCEMIA, WITH LONG-TERM CURRENT USE OF INSULIN (HCC): ICD-10-CM

## 2020-11-03 DIAGNOSIS — Z79.4 UNCONTROLLED TYPE 2 DIABETES MELLITUS WITH HYPERGLYCEMIA, WITH LONG-TERM CURRENT USE OF INSULIN (HCC): ICD-10-CM

## 2020-11-03 DIAGNOSIS — M81.0 AGE-RELATED OSTEOPOROSIS WITHOUT CURRENT PATHOLOGICAL FRACTURE: ICD-10-CM

## 2020-11-03 DIAGNOSIS — M51.36 DEGENERATIVE DISC DISEASE, LUMBAR: ICD-10-CM

## 2020-11-03 DIAGNOSIS — M35.01 SJOGREN'S SYNDROME WITH KERATOCONJUNCTIVITIS SICCA (HCC): Primary | ICD-10-CM

## 2020-11-03 PROCEDURE — 99213 OFFICE O/P EST LOW 20 MIN: CPT | Performed by: INTERNAL MEDICINE

## 2020-11-03 RX ORDER — HYDROCODONE BITARTRATE AND ACETAMINOPHEN 5; 325 MG/1; MG/1
1 TABLET ORAL EVERY 6 HOURS PRN
Qty: 120 TABLET | Refills: 0 | Status: SHIPPED | OUTPATIENT
Start: 2020-11-03 | End: 2020-11-05

## 2020-11-03 RX ORDER — HYDROCODONE BITARTRATE AND ACETAMINOPHEN 5; 325 MG/1; MG/1
1 TABLET ORAL EVERY 6 HOURS PRN
Qty: 120 TABLET | Refills: 0 | Status: SHIPPED | OUTPATIENT
Start: 2020-11-30 | End: 2020-11-05

## 2020-11-03 NOTE — PATIENT INSTRUCTIONS
Continue to drink plenty of water and use Biotene products as needed for Sjogren's. Use artificial tears as needed. Use Norco for severe pain 5 mg once or twice a day if needed. For milder pain take extra strength Tylenol 500 mg 2 tablets up to 6 a day.

## 2020-11-03 NOTE — PROGRESS NOTES
[facility]  Dr. Sierra Every Progress Note     Subjective:   Krishna Pena is a(n) [de-identified]year old female. Current complaints: Patient presents with:  Sjogren's Syndrome: est pt- LOV 6/25/20- MRI done 11/2. Still having dry mouth- states losing teeth.    H

## 2020-11-04 ENCOUNTER — TELEPHONE (OUTPATIENT)
Dept: RHEUMATOLOGY | Facility: CLINIC | Age: 80
End: 2020-11-04

## 2020-11-04 NOTE — TELEPHONE ENCOUNTER
Called pharmacy, spoke to [facility] to clarify Norco; 6/2020 5/325mg. 12/2019 pt was on 10/325mg. Called pt to update her. She verbalized understanding and agrees to call if any problems.

## 2020-11-05 RX ORDER — HYDROCODONE BITARTRATE AND ACETAMINOPHEN 10; 325 MG/1; MG/1
1 TABLET ORAL EVERY 6 HOURS PRN
Qty: 120 TABLET | Refills: 0 | Status: SHIPPED | OUTPATIENT
Start: 2020-11-05 | End: 2020-11-20

## 2020-11-05 NOTE — TELEPHONE ENCOUNTER
Please clarify strength of Norco pt should be on.  6/25/2020 notes reflect 5mg  11/3/2020 notes also 5mg    Yesterday I clarified with pharmacy 5mg were dispensed in 6/2020.     Today clarified with Wilma Boyle at [facility], 8/25/2020 10mg Norco #120    Pt has 5m

## 2020-11-06 DIAGNOSIS — I10 HYPERTENSION, ESSENTIAL, BENIGN: ICD-10-CM

## 2020-11-06 RX ORDER — VERAPAMIL HYDROCHLORIDE 120 MG/1
TABLET, FILM COATED ORAL
Qty: 180 TABLET | Refills: 0 | Status: SHIPPED | OUTPATIENT
Start: 2020-11-06 | End: 2020-11-14

## 2020-11-06 NOTE — TELEPHONE ENCOUNTER
Last time medication was refilled 9/23/2020  Quantity and number of refills 270 w/0  Last OV 10/22/2020  Next OV 4/9/2021

## 2020-11-06 NOTE — TELEPHONE ENCOUNTER
Norco 10 mg 1 every 6 hours ordered instead of 5 mg. Patient wants the 10 mg dose.   Dr. Albina العراقي

## 2020-11-09 DIAGNOSIS — E11.65 TYPE 2 DIABETES, UNCONTROLLED, WITH NEUROPATHY (HCC): ICD-10-CM

## 2020-11-09 DIAGNOSIS — E11.40 TYPE 2 DIABETES, UNCONTROLLED, WITH NEUROPATHY (HCC): ICD-10-CM

## 2020-11-09 RX ORDER — LIRAGLUTIDE 6 MG/ML
INJECTION SUBCUTANEOUS
Qty: 9 ML | Refills: 0 | Status: SHIPPED | OUTPATIENT
Start: 2020-11-09 | End: 2020-11-14

## 2020-11-12 DIAGNOSIS — F34.1 DYSTHYMIA: ICD-10-CM

## 2020-11-13 RX ORDER — FLUOXETINE HYDROCHLORIDE 20 MG/1
CAPSULE ORAL
Qty: 90 CAPSULE | Refills: 0 | Status: SHIPPED | OUTPATIENT
Start: 2020-11-13 | End: 2020-11-14

## 2020-11-13 NOTE — TELEPHONE ENCOUNTER
Last time medication was refilled 3/23/2020  Quantity and number of refills 90 w/ 1  Last OV 10/22/2020  Next OV 4/9/21

## 2020-11-14 ENCOUNTER — OFFICE VISIT (OUTPATIENT)
Dept: INTERNAL MEDICINE CLINIC | Facility: CLINIC | Age: 80
End: 2020-11-14
Payer: MEDICARE

## 2020-11-14 VITALS
HEART RATE: 79 BPM | HEIGHT: 60 IN | SYSTOLIC BLOOD PRESSURE: 120 MMHG | WEIGHT: 123 LBS | DIASTOLIC BLOOD PRESSURE: 65 MMHG | RESPIRATION RATE: 16 BRPM | TEMPERATURE: 97 F | BODY MASS INDEX: 24.15 KG/M2 | OXYGEN SATURATION: 97 %

## 2020-11-14 DIAGNOSIS — I10 HYPERTENSION, ESSENTIAL, BENIGN: ICD-10-CM

## 2020-11-14 PROCEDURE — 99213 OFFICE O/P EST LOW 20 MIN: CPT | Performed by: INTERNAL MEDICINE

## 2020-11-14 RX ORDER — IRBESARTAN 300 MG/1
300 TABLET ORAL DAILY
Qty: 90 TABLET | Refills: 1 | Status: SHIPPED | OUTPATIENT
Start: 2020-11-14

## 2020-11-14 RX ORDER — BUPROPION HYDROCHLORIDE 300 MG/1
300 TABLET ORAL DAILY
Qty: 90 TABLET | Refills: 1 | Status: SHIPPED | OUTPATIENT
Start: 2020-11-14 | End: 2021-12-30

## 2020-11-14 RX ORDER — VERAPAMIL HYDROCHLORIDE 120 MG/1
120 TABLET, FILM COATED ORAL 2 TIMES DAILY
Qty: 180 TABLET | Refills: 0 | Status: SHIPPED | OUTPATIENT
Start: 2020-11-14

## 2020-11-14 RX ORDER — ATORVASTATIN CALCIUM 40 MG/1
40 TABLET, FILM COATED ORAL DAILY
Qty: 90 TABLET | Refills: 1 | Status: SHIPPED | OUTPATIENT
Start: 2020-11-14

## 2020-11-14 RX ORDER — FLUOXETINE HYDROCHLORIDE 20 MG/1
20 CAPSULE ORAL DAILY
Qty: 90 CAPSULE | Refills: 1 | Status: SHIPPED | OUTPATIENT
Start: 2020-11-14 | End: 2021-11-27

## 2020-11-14 RX ORDER — LEVOTHYROXINE SODIUM 0.12 MG/1
125 TABLET ORAL
Qty: 90 TABLET | Refills: 1 | Status: SHIPPED | OUTPATIENT
Start: 2020-11-14 | End: 2020-11-30

## 2020-11-14 RX ORDER — LIRAGLUTIDE 6 MG/ML
1.8 INJECTION SUBCUTANEOUS DAILY
Qty: 9 ML | Refills: 1 | Status: SHIPPED | OUTPATIENT
Start: 2020-11-14 | End: 2020-11-19

## 2020-11-14 RX ORDER — METOPROLOL SUCCINATE 25 MG/1
25 TABLET, EXTENDED RELEASE ORAL DAILY
Qty: 90 TABLET | Refills: 1 | Status: SHIPPED | OUTPATIENT
Start: 2020-11-14 | End: 2021-10-17

## 2020-11-14 NOTE — PATIENT INSTRUCTIONS
Diabetes: Activity Tips    Being more active can help you manage your diabetes. The tips on this sheet can help you get the most from your exercise. They can also help you stay safe.    Staying active   It’s important for adults to spend less time sitting You may be told to plan your exercise for 1 to 2 hours after a meal. In most cases, you don’t need to eat while being active. Test your blood sugar before exercising if you take insulin or medicine that can cause low blood sugar.  And carry a fast-acting russo
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
## 2020-11-19 ENCOUNTER — OFFICE VISIT (OUTPATIENT)
Dept: ENDOCRINOLOGY CLINIC | Facility: CLINIC | Age: 80
End: 2020-11-19
Payer: MEDICARE

## 2020-11-19 VITALS
WEIGHT: 128 LBS | HEART RATE: 84 BPM | HEIGHT: 60 IN | TEMPERATURE: 98 F | OXYGEN SATURATION: 95 % | DIASTOLIC BLOOD PRESSURE: 80 MMHG | SYSTOLIC BLOOD PRESSURE: 120 MMHG | BODY MASS INDEX: 25.13 KG/M2

## 2020-11-19 DIAGNOSIS — Z79.4 TYPE 2 DIABETES MELLITUS WITH HYPERGLYCEMIA, WITH LONG-TERM CURRENT USE OF INSULIN (HCC): Primary | ICD-10-CM

## 2020-11-19 DIAGNOSIS — Z79.4 LONG-TERM INSULIN USE (HCC): ICD-10-CM

## 2020-11-19 DIAGNOSIS — E11.65 TYPE 2 DIABETES MELLITUS WITH HYPERGLYCEMIA, WITH LONG-TERM CURRENT USE OF INSULIN (HCC): Primary | ICD-10-CM

## 2020-11-19 PROCEDURE — 99214 OFFICE O/P EST MOD 30 MIN: CPT | Performed by: NURSE PRACTITIONER

## 2020-11-19 PROCEDURE — 95251 CONT GLUC MNTR ANALYSIS I&R: CPT | Performed by: NURSE PRACTITIONER

## 2020-11-19 PROCEDURE — 83036 HEMOGLOBIN GLYCOSYLATED A1C: CPT | Performed by: NURSE PRACTITIONER

## 2020-11-19 RX ORDER — INSULIN GLARGINE 100 [IU]/ML
INJECTION, SOLUTION SUBCUTANEOUS
Qty: 15 ML | Refills: 1 | Status: SHIPPED | OUTPATIENT
Start: 2020-11-19

## 2020-11-19 RX ORDER — LIRAGLUTIDE 6 MG/ML
1.8 INJECTION SUBCUTANEOUS DAILY
Qty: 9 ML | Refills: 1 | Status: SHIPPED | OUTPATIENT
Start: 2020-11-19 | End: 2020-12-01

## 2020-11-19 RX ORDER — INSULIN ASPART 100 [IU]/ML
INJECTION, SOLUTION INTRAVENOUS; SUBCUTANEOUS
Qty: 15 ML | Refills: 1 | Status: SHIPPED | OUTPATIENT
Start: 2020-11-19 | End: 2021-02-08

## 2020-11-19 NOTE — PROGRESS NOTES
Andre Cooper is a [de-identified]year old female presents today for diabetes management.    Primary care physician: Ana Juan MD   In the past 3m her DM Control has increased to 8.4% (last A1C 7.5%)   Feels she has been more hungry - and was eating small pieces to take NOVOLOG   Correction scale:   175- 250 ,  Give 1 u   Over 250 , give 2 units             HGBA1C:    Lab Results   Component Value Date    A1C 8.4 (A) 11/19/2020    A1C 7.5 (H) 06/09/2020    A1C 6.6 (A) 01/06/2020     (H) 06/09/2020       Lab long-term (current) use of insulin (HCC)    • Esophageal reflux    • Excessive bleeding    • Fibromyalgia    • Flatulence/gas pain/belching    • HEADACHES     migraines   • Hearing loss    • Hemorrhoids    • High cholesterol    • History of depression    • thyroid disease   • TONSILLECTOMY     • TOTAL KNEE REPLACEMENT      ROBERT     Social History    Tobacco Use      Smoking status: Former Smoker        Packs/day: 1.00        Years: 8.00        Pack years: 8        Quit date: 6/7/1967        Years since Afanist 125 MCG Oral Tab Take 1 tablet (125 mcg total) by mouth every morning before breakfast. 90 tablet 1   • atorvastatin 40 MG Oral Tab Take 1 tablet (40 mg total) by mouth daily.  TAKE ONE TABLET BY MOUTH NIGHTLY 90 tablet 1   • HYDROcodone-acetaminophen (NORC Negative for sleep disturbance. Physical exam:  /80   Pulse 84   Temp 97.9 °F (36.6 °C)   Ht 60\"   Wt 128 lb (58.1 kg)   SpO2 95%   Breastfeeding No   BMI 25.00 kg/m²   Body mass index is 25 kg/m².   Physical Exam   Constitutional: She is orien This Encounter      Hgb A1C          Order Specific Question: Release to patient          Answer: Immediate      Liraglutide (VICTOZA) 18 MG/3ML Subcutaneous Solution Pen-injector          Sig: Inject 1.8 mg into the skin daily.           Dispense:  9 mL

## 2020-11-19 NOTE — PATIENT INSTRUCTIONS
Your A1C: 8.4% - this is up from 7.5%   I would like to see your A1C less than 8.0%     Look up FRIO insulin carrying cases- it offers reuseable cold packs   The A1C test provides us with your average blood sugar for the past 3 months.  Keeping an A1C less hungry  · Headache  · Nervousness  · A hard, fast heartbeat  · Weakness  · Confusion or irritability  · Blurred eyesight  · Having nightmares or waking up confused or sweating  · Numbness or tingling in the lips or tongue    Treatment of Low Blood sugar Ac Backordered), it is your responsibility to find another pharmacy that has the requested medication available.   We will gladly send a new prescription to that pharmacy at your request.     Thank you   Sierra Lai   [phone]

## 2020-11-20 ENCOUNTER — TELEPHONE (OUTPATIENT)
Dept: RHEUMATOLOGY | Facility: CLINIC | Age: 80
End: 2020-11-20

## 2020-11-20 DIAGNOSIS — M51.36 DEGENERATIVE DISC DISEASE, LUMBAR: ICD-10-CM

## 2020-11-20 DIAGNOSIS — M79.7 FIBROMYALGIA: Primary | ICD-10-CM

## 2020-11-20 RX ORDER — HYDROCODONE BITARTRATE AND ACETAMINOPHEN 10; 325 MG/1; MG/1
1 TABLET ORAL EVERY 6 HOURS PRN
Qty: 120 TABLET | Refills: 0 | Status: SHIPPED | OUTPATIENT
Start: 2020-11-30

## 2020-11-20 RX ORDER — HYDROCODONE BITARTRATE AND ACETAMINOPHEN 10; 325 MG/1; MG/1
1 TABLET ORAL EVERY 6 HOURS PRN
Qty: 120 TABLET | Refills: 0 | Status: SHIPPED | OUTPATIENT
Start: 2020-11-20 | End: 2020-11-20 | Stop reason: CLARIF

## 2020-11-20 NOTE — TELEPHONE ENCOUNTER
Norco refill requested 10 mg one every 6 hours. #120. For 11/30/20  Moving out of town  [place]. Patient called, instructed on proper use of NOrco.

## 2020-11-24 RX ORDER — LEVOTHYROXINE SODIUM 0.12 MG/1
TABLET ORAL
Qty: 90 TABLET | Refills: 0 | OUTPATIENT
Start: 2020-11-24

## 2020-11-28 ENCOUNTER — MED REC SCAN ONLY (OUTPATIENT)
Dept: INTERNAL MEDICINE CLINIC | Facility: CLINIC | Age: 80
End: 2020-11-28

## 2020-11-30 ENCOUNTER — TELEPHONE (OUTPATIENT)
Dept: INTERNAL MEDICINE CLINIC | Facility: CLINIC | Age: 80
End: 2020-11-30

## 2020-11-30 DIAGNOSIS — E11.65 TYPE 2 DIABETES MELLITUS WITH HYPERGLYCEMIA, WITH LONG-TERM CURRENT USE OF INSULIN (HCC): ICD-10-CM

## 2020-11-30 DIAGNOSIS — Z79.4 TYPE 2 DIABETES MELLITUS WITH HYPERGLYCEMIA, WITH LONG-TERM CURRENT USE OF INSULIN (HCC): ICD-10-CM

## 2020-11-30 RX ORDER — LEVOTHYROXINE SODIUM 0.12 MG/1
TABLET ORAL
Qty: 90 TABLET | Refills: 0 | Status: SHIPPED | OUTPATIENT
Start: 2020-11-30

## 2020-11-30 NOTE — TELEPHONE ENCOUNTER
Patient called and stated she is running out of her Synthroid, and needs a refill to be sent to [place] on file. Patient also mentioned she is having back spasms and will be going to [address] in a car, and needs something to help her.

## 2020-11-30 NOTE — TELEPHONE ENCOUNTER
Pt will need video visit for muscle spasms.  personnel has left message for pt to schedule. Levothyroxine was sent today.

## 2020-12-01 ENCOUNTER — TELEMEDICINE (OUTPATIENT)
Dept: INTERNAL MEDICINE CLINIC | Facility: CLINIC | Age: 80
End: 2020-12-01
Payer: MEDICARE

## 2020-12-01 DIAGNOSIS — S39.012A LOW BACK STRAIN, INITIAL ENCOUNTER: Primary | ICD-10-CM

## 2020-12-01 PROCEDURE — 99214 OFFICE O/P EST MOD 30 MIN: CPT | Performed by: INTERNAL MEDICINE

## 2020-12-01 RX ORDER — CYCLOBENZAPRINE HCL 10 MG
10 TABLET ORAL 2 TIMES DAILY PRN
Qty: 14 TABLET | Refills: 0 | Status: SHIPPED | OUTPATIENT
Start: 2020-12-01

## 2020-12-01 RX ORDER — LIRAGLUTIDE 6 MG/ML
1.8 INJECTION SUBCUTANEOUS DAILY
Qty: 9 ML | Refills: 0 | Status: SHIPPED | OUTPATIENT
Start: 2020-12-01

## 2020-12-01 NOTE — PROGRESS NOTES
HPI:    Patient ID: Luellen Gowers is a [de-identified]year old female. This visit is conducted using Telemedicine with live, interactive video and audio    Back Pain      HPI:   Luellen Gowers is a [de-identified]year old female who is here for complaints of back pain.   Yaneth Xie tablet (25 mg total) by mouth daily. 90 tablet 1   • atorvastatin 40 MG Oral Tab Take 1 tablet (40 mg total) by mouth daily.  TAKE ONE TABLET BY MOUTH NIGHTLY 90 tablet 1   • betamethasone dipropionate 0.05 % External Cream Apply 1 Application topically 2 ( HOSPITALIZATIONS     work up for TIA vs. stroke and was dx with migraines   • Hypertension    • IBS (irritable bowel syndrome)    • Irregular bowel habits    • Leaking of urine    • Nausea    • Night sweats    •  (normal spontaneous vaginal delivery) DM   • Breast Cancer Sister 39         at 52   • Diabetes Brother         Type 2 DM   • Heart Disorder Mother    • Hypertension Son    • Diabetes Paternal Grandfather         Type 2 DM   • Thyroid Disorder Other    • Breast Cancer Paternal Cousin Femal Pen-injector INJECT 1.8 MG INTO THE SKIN DAILY 9 mL 0   • LEVOTHYROXINE SODIUM 125 MCG Oral Tab TAKE ONE TABLET BY MOUTH EVERY MORNING BEFORE BREAKFAST  90 tablet 0   • HYDROcodone-acetaminophen (NORCO)  MG Oral Tab Take 1 tablet by mouth every 6 (si ULTRA BLUE) In Vitro Strip Check blood sugars 3 times per day.  Dx: Type 2 DM (E11.65) on insulin 300 strip 1   • SF 1.1 % Dental Gel   5   • ONETOUCH DELICA LANCETS Does not apply Misc For insulin use 3 times per day 300 each 1   • MELATONIN 1 tablet night

## 2020-12-01 NOTE — PATIENT INSTRUCTIONS
Back Sprain or Strain     Injury to the muscles (strain) or ligaments (sprain) around the spine can be troubling.  Injury may occur after a sudden forceful twisting or bending such as in a car accident, after a simple awkward movement, or after lifting so · You can alternate the ice and heat. Talk with your healthcare provider to find out the best treatment or therapy for your back pain. · Therapeutic massage can help relax the back muscles without stretching them. · Be aware of safe lifting methods.  Rosa Resendez Call your healthcare provider right away if any of the following occur:  · Pain gets worse or spreads to your arms or legs  · Weakness or numbness in one or both arms or legs  · Numbness in the groin or genital area  Laura last reviewed this educational

## 2020-12-08 ENCOUNTER — TELEPHONE (OUTPATIENT)
Dept: RHEUMATOLOGY | Facility: CLINIC | Age: 80
End: 2020-12-08

## 2021-02-02 DIAGNOSIS — Z23 NEED FOR VACCINATION: ICD-10-CM

## 2021-02-04 ENCOUNTER — TELEPHONE (OUTPATIENT)
Dept: ENDOCRINOLOGY CLINIC | Facility: CLINIC | Age: 81
End: 2021-02-04

## 2021-02-04 DIAGNOSIS — Z79.4 TYPE 2 DIABETES MELLITUS WITH HYPERGLYCEMIA, WITH LONG-TERM CURRENT USE OF INSULIN (HCC): Primary | ICD-10-CM

## 2021-02-04 DIAGNOSIS — E11.65 TYPE 2 DIABETES MELLITUS WITH HYPERGLYCEMIA, WITH LONG-TERM CURRENT USE OF INSULIN (HCC): Primary | ICD-10-CM

## 2021-02-04 PROCEDURE — 95251 CONT GLUC MNTR ANALYSIS I&R: CPT | Performed by: NURSE PRACTITIONER

## 2021-02-04 NOTE — TELEPHONE ENCOUNTER
Pt called today moved to [facility] found a pcp but not a diabetic doctor yet would like advice on her sugar dexcom report was email fax to you. Please advice?

## 2021-02-04 NOTE — TELEPHONE ENCOUNTER
Called pt and LM with the dose increase of Lantus to 10 units from 7. Read all of the other Victoza and Novolog info and LMTCB if she has any questions.

## 2021-02-05 ENCOUNTER — MED REC SCAN ONLY (OUTPATIENT)
Dept: ENDOCRINOLOGY CLINIC | Facility: CLINIC | Age: 81
End: 2021-02-05

## 2021-02-08 DIAGNOSIS — Z79.4 TYPE 2 DIABETES MELLITUS WITH HYPERGLYCEMIA, WITH LONG-TERM CURRENT USE OF INSULIN (HCC): ICD-10-CM

## 2021-02-08 DIAGNOSIS — E11.65 TYPE 2 DIABETES MELLITUS WITH HYPERGLYCEMIA, WITH LONG-TERM CURRENT USE OF INSULIN (HCC): ICD-10-CM

## 2021-02-08 RX ORDER — INSULIN ASPART 100 [IU]/ML
INJECTION, SOLUTION INTRAVENOUS; SUBCUTANEOUS
Qty: 15 ML | Refills: 1 | Status: SHIPPED | OUTPATIENT
Start: 2021-02-08

## 2021-02-08 NOTE — TELEPHONE ENCOUNTER
Cristal Coughlin called in after realizing she is out of rapid acting insulin. Request refill to [facility] in [place], [place]. Pended. Pt has a visit with a new PCP in [place] this Wednesday.     Pt states she has moved to [place] full time and would like Shana Watts

## 2021-04-20 ENCOUNTER — TELEPHONE (OUTPATIENT)
Dept: ENDOCRINOLOGY CLINIC | Facility: CLINIC | Age: 81
End: 2021-04-20

## 2021-06-08 ENCOUNTER — TELEPHONE (OUTPATIENT)
Dept: INTERNAL MEDICINE CLINIC | Facility: CLINIC | Age: 81
End: 2021-06-08

## 2021-06-08 NOTE — TELEPHONE ENCOUNTER
Req for Medical Records    [facility]  Fx: [phone]    All Medical Records  Office Notes  ECHO  [employer]  Most recent labs.      Sent ot Scan STAT

## 2021-06-10 ENCOUNTER — OFFICE VISIT (OUTPATIENT)
Dept: URBAN - METROPOLITAN AREA CLINIC 51 | Facility: CLINIC | Age: 81
End: 2021-06-10
Payer: MEDICARE

## 2021-06-10 DIAGNOSIS — Z96.1 PRESENCE OF INTRAOCULAR LENS: Primary | ICD-10-CM

## 2021-06-10 DIAGNOSIS — H35.373 PUCKERING OF MACULA, BILATERAL: ICD-10-CM

## 2021-06-10 PROCEDURE — 99204 OFFICE O/P NEW MOD 45 MIN: CPT | Performed by: OPHTHALMOLOGY

## 2021-06-10 PROCEDURE — 92134 CPTRZ OPH DX IMG PST SGM RTA: CPT | Performed by: OPHTHALMOLOGY

## 2021-06-10 ASSESSMENT — VISUAL ACUITY
OD: 20/25
OS: 20/25

## 2021-06-10 ASSESSMENT — INTRAOCULAR PRESSURE
OD: 12
OS: 14

## 2021-06-10 NOTE — IMPRESSION/PLAN
Impression: Puckering of macula, bilateral: H35.373. Plan: OCT shows ERM OU so will have her see Dr Tatyana Tracy next available.

## 2021-06-10 NOTE — IMPRESSION/PLAN
Impression: Presence of intraocular lens: Z96.1. Plan: PC IOL good position. Reviewed with patient . .. Patient will contact the office immediately  if notes any new  vision changes.

## 2021-10-17 DIAGNOSIS — I10 HYPERTENSION, ESSENTIAL, BENIGN: ICD-10-CM

## 2021-10-17 RX ORDER — METOPROLOL SUCCINATE 25 MG/1
TABLET, EXTENDED RELEASE ORAL
Qty: 90 TABLET | Refills: 0 | Status: SHIPPED | OUTPATIENT
Start: 2021-10-17 | End: 2021-11-27

## 2021-11-27 DIAGNOSIS — I10 HYPERTENSION, ESSENTIAL, BENIGN: ICD-10-CM

## 2021-11-27 RX ORDER — METOPROLOL SUCCINATE 25 MG/1
TABLET, EXTENDED RELEASE ORAL
Qty: 90 TABLET | Refills: 0 | Status: SHIPPED | OUTPATIENT
Start: 2021-11-27

## 2021-11-27 RX ORDER — FLUOXETINE HYDROCHLORIDE 20 MG/1
CAPSULE ORAL
Qty: 90 CAPSULE | Refills: 1 | Status: SHIPPED | OUTPATIENT
Start: 2021-11-27

## 2022-06-27 ENCOUNTER — OFFICE VISIT (OUTPATIENT)
Dept: URBAN - METROPOLITAN AREA CLINIC 45 | Facility: CLINIC | Age: 82
End: 2022-06-27
Payer: MEDICARE

## 2022-06-27 DIAGNOSIS — H35.373 PUCKERING OF MACULA, BILATERAL: Primary | ICD-10-CM

## 2022-06-27 PROCEDURE — 92014 COMPRE OPH EXAM EST PT 1/>: CPT | Performed by: OPHTHALMOLOGY

## 2022-06-27 PROCEDURE — 92134 CPTRZ OPH DX IMG PST SGM RTA: CPT | Performed by: OPHTHALMOLOGY

## 2022-06-27 ASSESSMENT — INTRAOCULAR PRESSURE
OD: 11
OS: 11

## 2022-06-27 NOTE — IMPRESSION/PLAN
Impression: Puckering of macula, bilateral: H35.373. Plan:  Longstanding mild ERM OU (OD with some cysts) [facility] good and patient without symptoms Discussed condition/plan with patient including observation vs PPV/ILMx -- the patient elects to observe now given mild nature of ERM and lack of symptoms.  
 
 6m OCT/exam

## 2023-03-02 ENCOUNTER — TELEPHONE (OUTPATIENT)
Dept: INTERNAL MEDICINE CLINIC | Facility: CLINIC | Age: 83
End: 2023-03-02

## 2023-03-02 NOTE — TELEPHONE ENCOUNTER
Pt's spouse called back and stated currently residing in [place] and no longer pt's of Dr. Francisco Hancock    pcp removal initiated

## 2023-03-14 ENCOUNTER — PATIENT OUTREACH (OUTPATIENT)
Dept: CASE MANAGEMENT | Age: 83
End: 2023-03-14

## 2023-03-14 NOTE — PROCEDURES
The office order for PCP removal request is Approved and finalized on March 14, 2023.     Thanks,  [facility] [employer]

## 2024-04-26 ENCOUNTER — OFFICE VISIT (OUTPATIENT)
Dept: URBAN - METROPOLITAN AREA CLINIC 45 | Facility: CLINIC | Age: 84
End: 2024-04-26
Payer: MEDICARE

## 2024-04-26 DIAGNOSIS — R51.9 HEADACHE, UNSPECIFIED: ICD-10-CM

## 2024-04-26 DIAGNOSIS — H43.813 VITREOUS DEGENERATION, BILATERAL: ICD-10-CM

## 2024-04-26 DIAGNOSIS — H53.8 OTHER VISUAL DISTURBANCES: ICD-10-CM

## 2024-04-26 DIAGNOSIS — E11.9 TYPE 2 DIABETES MELLITUS WITHOUT COMPLICATIONS: ICD-10-CM

## 2024-04-26 DIAGNOSIS — H04.123 DRY EYE SYNDROME OF BILATERAL LACRIMAL GLANDS: ICD-10-CM

## 2024-04-26 DIAGNOSIS — H35.373 PUCKERING OF MACULA, BILATERAL: Primary | ICD-10-CM

## 2024-04-26 PROCEDURE — 92134 CPTRZ OPH DX IMG PST SGM RTA: CPT | Performed by: OPTOMETRIST

## 2024-04-26 PROCEDURE — 92004 COMPRE OPH EXAM NEW PT 1/>: CPT | Performed by: OPTOMETRIST

## 2024-04-26 ASSESSMENT — INTRAOCULAR PRESSURE
OD: 12
OS: 12

## 2024-12-02 NOTE — PROGRESS NOTES
Encounter Date: 12/2/2024       History     Chief Complaint   Patient presents with    Chest Pain     Intermittent cp that started last night.      31-year-old female past medical history of endometriosis, preeclampsia, palpitations presenting secondary to chest fullness that has been intermittent and ongoing for the past couple years.  Started having symptoms again last night.  No fevers chills runny cough congestion shortness of breath.  No wheezing.  Does not smoke.  No alleviating worsening factors.  Not associated with exertion.  No nausea or vomiting.  Did notice that whenever she presses on the area there is some tenderness.  No urinary complaints.  Last menstrual period a week ago.        Review of patient's allergies indicates:   Allergen Reactions    Doxycycline Other (See Comments)     Triggers headaches    Bactrim ds [sulfamethoxazole-trimethoprim] Nausea And Vomiting    Nsaids (non-steroidal anti-inflammatory drug) Other (See Comments)     D/t h/o PUD and H pylori    Codeine Rash     Past Medical History:   Diagnosis Date    Bilateral knee pain 06/19/2018    Endometriosis     treated by GYN at     Epigastric abdominal pain 12/05/2016    Gastroesophageal reflux disease 07/06/2016    Helicobacter pylori ab+     Helicobacter pylori ab+     High-tone pelvic floor dysfunction 05/02/2016    IIH (idiopathic intracranial hypertension)     Dr. Rogers - [facility]    Palpitations 07/06/2016    Pre-eclampsia in postpartum period 08/16/2022     Past Surgical History:   Procedure Laterality Date    ARTHROSCOPY OF KNEE Left 04/06/2021    Procedure: ARTHROSCOPY, KNEE;  Surgeon: Deidra Mejias MD;  [facility];  Service: Orthopedics;  Laterality: Left;    COLONOSCOPY N/A 12/13/2018    Procedure: COLONOSCOPY;  Surgeon: Micki Gross MD;  Location: [address]);  Service: Endoscopy;  Laterality: N/A;  preferrably in Dec 2018, CRS ok if needed.    COLONOSCOPY N/A 11/05/2022    Procedure: COLONOSCOPY;   Referring Provider: Jj Sparks MD/Gabby Sexton PA-C    Reason for Referral:  Marian Zapien was referred for genetic counseling because of a family history of breast cancer.   Ms. Dawn Young is a 67year-old woman of [place] descent who Surgeon: Greg Leach MD;  Location: [facility] (4TH FLR);  Service: Endoscopy;  Laterality: N/A;  Endoscopy schedulers please schedule patient for colonoscopy with me for further evaluation of painless hematochezia thank you    COLONOSCOPY N/A 10/17/2024    Procedure: COLONOSCOPY;  Surgeon: Tom Collazo MD;  Location: [facility] (2ND FLR);  Service: Endoscopy;  Laterality: N/A;    ESOPHAGOGASTRODUODENOSCOPY N/A 10/30/2018    Procedure: EGD (ESOPHAGOGASTRODUODENOSCOPY);  Surgeon: Greg Leach MD;  Location: [facility] (2ND FLR);  Service: Endoscopy;  Laterality: N/A;  ok to schedule per Kimmy    ESOPHAGOGASTRODUODENOSCOPY N/A 11/05/2022    Procedure: EGD (ESOPHAGOGASTRODUODENOSCOPY);  Surgeon: Greg Leach MD;  Location: [facility] (4TH FLR);  Service: Endoscopy;  Laterality: N/A;  Endoscopy schedulers please schedule patient for EGD for evaluation of right upper quadrant pain thank you  prep instr portal--ml  11/2/22 room closed-pt okay with Dr. Leach-50 min case okay per Katlyn-ml    ESOPHAGOGASTRODUODENOSCOPY N/A 10/17/2024    Procedure: EGD (ESOPHAGOGASTRODUODENOSCOPY);  Surgeon: Tom Collazo MD;  Location: [facility] (2ND FLR);  Service: Endoscopy;  Laterality: N/A;  Labat/Suflave/portal/ 2nd floor due to availability SW    KNEE ARTHROSCOPY W/ DEBRIDEMENT  04/06/2021    Procedure: ARTHROSCOPY, KNEE, WITH DEBRIDEMENT;  Surgeon: Deidra Mejias MD;  Location: [facility] OR;  Service: Orthopedics;;    KNEE ARTHROSCOPY W/ MENISCECTOMY Right 04/06/2021    Procedure: ARTHROSCOPY, KNEE, WITH MENISCECTOMY;  Surgeon: Deidra Mejias MD;  Location: [facility] OR;  Service: Orthopedics;  Laterality: Right;    KNEE ARTHROSCOPY W/ PLICA EXCISION Left 04/06/2021    Procedure: EXCISION, PLICA, KNEE, ARTHROSCOPIC;  Surgeon: Deidra Mejias MD;  Location: [facility] OR;  Service: Orthopedics;  Laterality: Left;    LAPAROSCOPIC CHOLECYSTECTOMY N/A 4/11/2024    Procedure: CHOLECYSTECTOMY, LAPAROSCOPIC;  Surgeon: Tom Campos Jr., MD;   individual, or the same type of cancer or related cancers (e.g., breast and ovarian, colorectal and endometrial) in three or more individuals in the same lineage.   Mutations in the genes, BRCA1 and BRCA2, account for the majority of hereditary breast and o If Ms. Lucretia Hopper is found to carry a BRCA1/2 pathogenic variant, she is at significantly increased risk for breast, ovarian, and other cancers.  This knowledge would influence her medical recommendations and choices regarding surveillance, screening, and Location: [facility] OR [facility] FLR;  Service: General;  Laterality: N/A;    LAPAROSCOPY  2017    dx with endo.  no removal    NO PAST SURGERIES      SYNOVECTOMY OF KNEE Left 04/06/2021    Procedure: SYNOVECTOMY, KNEE;  Surgeon: Deidra Mejias MD;  Location: [facility] OR;  Service: Orthopedics;  Laterality: Left;    UPPER GASTROINTESTINAL ENDOSCOPY       Family History   Problem Relation Name Age of Onset    Diabetes Mother lizette chase     No Known Problems Father      No Known Problems Sister      Hypertension Brother bolivar chase jr.     Hypertension Maternal Grandmother      Hypertension Maternal Grandfather soraida and carol burrell     No Known Problems Paternal Grandmother      No Known Problems Paternal Grandfather      Colon cancer Neg Hx      Crohn's disease Neg Hx      Esophageal cancer Neg Hx      Inflammatory bowel disease Neg Hx      Irritable bowel syndrome Neg Hx      Rectal cancer Neg Hx      Stomach cancer Neg Hx      Ulcerative colitis Neg Hx      Celiac disease Neg Hx      Cirrhosis Neg Hx      Colon polyps Neg Hx      Liver cancer Neg Hx      Liver disease Neg Hx       Social History     Tobacco Use    Smoking status: Never     Passive exposure: Never    Smokeless tobacco: Never   Substance Use Topics    Alcohol use: No    Drug use: No     Review of Systems   Constitutional:  Negative for fever.   HENT:  Negative for sore throat.    Respiratory:  Positive for chest tightness. Negative for shortness of breath.    Cardiovascular:  Negative for chest pain.   Gastrointestinal:  Negative for nausea.   Genitourinary:  Negative for dysuria.   Musculoskeletal:  Negative for back pain.   Skin:  Negative for rash.   Neurological:  Negative for weakness.   Hematological:  Does not bruise/bleed easily.       Physical Exam     Initial Vitals [12/02/24 1541]   BP Pulse Resp Temp SpO2   118/74 86 14 98.2 °F (36.8 °C) 99 %      MAP       --         Physical Exam    Nursing note and vitals reviewed.  Constitutional: She  appears well-developed and well-nourished.   HENT:   Head: Normocephalic and atraumatic. Mouth/Throat: Oropharynx is clear and moist and mucous membranes are normal.   Eyes: Conjunctivae and EOM are normal. Pupils are equal, round, and reactive to light. Right conjunctiva is not injected. Left conjunctiva is not injected. No scleral icterus.   Neck: Neck supple.   Normal range of motion.   Full passive range of motion without pain.     Cardiovascular:  Normal rate, regular rhythm, S1 normal, S2 normal, normal heart sounds and normal pulses.     Exam reveals no gallop and no friction rub.       No murmur heard.  Pulses:       Radial pulses are 2+ on the right side and 2+ on the left side.   Pulmonary/Chest: Effort normal and breath sounds normal. No respiratory distress. She exhibits tenderness.   Abdominal: Abdomen is soft. She exhibits no distension. There is no abdominal tenderness.   Musculoskeletal:         General: No edema. Normal range of motion.      Cervical back: Full passive range of motion without pain, normal range of motion and neck supple.      Comments: Good active ROM of all extremities. No lower extremity edema or cyanosis.      Neurological: No cranial nerve deficit. Gait normal.   A&Ox4, normal speech.   Skin: Skin is warm. No ecchymosis and no rash noted.   Psychiatric: She has a normal mood and affect. Thought content normal.         ED Course   Procedures  Labs Reviewed   CBC W/ AUTO DIFFERENTIAL - Abnormal       Result Value    WBC 8.33      RBC 4.94      Hemoglobin 14.3      Hematocrit 44.8      MCV 91      MCH 28.9      MCHC 31.9 (*)     RDW 12.8      Platelets 294      MPV 10.5      Immature Granulocytes 0.2      Gran # (ANC) 4.6      Immature Grans (Abs) 0.02      Lymph # 2.8      Mono # 0.6      Eos # 0.4      Baso # 0.07      nRBC 0      Gran % 54.7      Lymph % 33.5      Mono % 6.6      Eosinophil % 4.2      Basophil % 0.8      Differential Method Automated     COMPREHENSIVE METABOLIC  up to a 7% lifetime risk for pancreatic cancer, and up to a 7% lifetime risk of male breast cancer.     Medical management options for BRCA1/2 mutation carriers include increased breast cancer screening using mammograms and MRI, ovarian cancer screening, ch PANEL - Abnormal    Sodium 141      Potassium 3.3 (*)     Chloride 108      CO2 23      Glucose 78      BUN 8      Creatinine 0.8      Calcium 9.1      Total Protein 7.5      Albumin 3.7      Total Bilirubin 0.4      Alkaline Phosphatase 65      AST 17      ALT 12      eGFR >60      Anion Gap 10     TROPONIN I    Troponin I 0.013     B-TYPE NATRIURETIC PEPTIDE    BNP 19     POCT URINE PREGNANCY    POC Preg Test, Ur Negative       Acceptable Yes       EKG Readings: (Independently Interpreted)   EKG done at 3:37 p.m. showed normal sinus rhythm rate 85.  Flat T-waves diffusely.  Normal axis QRS.  QTC is 421.  Nonspecific EKG.       Imaging Results              X-Ray Chest AP Portable (Final result)  Result time 12/02/24 17:29:12      Final result by Johnny Galindo MD (12/02/24 17:29:12)                   Impression:      No acute chest disease identified.      Electronically signed by: Johnny Galindo MD  Date:    12/02/2024  Time:    17:29               Narrative:    EXAMINATION:  XR CHEST AP PORTABLE    CLINICAL HISTORY:  Chest pain, unspecified    TECHNIQUE:  Single frontal view of the chest was performed.    COMPARISON:  09/05/2023.    FINDINGS:  The heart and mediastinal structures appear unremarkable.  Pulmonary vasculature is within normal limits.  The lungs are free of focal consolidations.  There is no evidence for pneumothorax or large pleural effusions.  Bony structures are grossly intact.                                       Medications   aspirin tablet 325 mg (325 mg Oral Given 12/2/24 1800)     Medical Decision Making  31 year old patient with hx of medical problems above presenting secondary to chest pain. Patient is at lower risk of ACS due to risk factors and HPI with a heart score of 1. Patient has received an aspirin. EKG was reassuring and chest xray showed nothing acute. Most likely musculoskeletal cause of patient.      [email or web address]    Also considered but less likely:     PE: normal rate, no sob/recent immobilization/surgery/travel/family history. PERC 0  Pneumonia: chest xray negative. No fever. No cough and lungs non consistent with pna  Tamponade: unlikely due to chest xray and ekg  STEMI: No STEMI on ekg  Dissection: equal pulses bilaterally and no ripping chest pain to the back  Esophageal rupture: no dysphagia or vomiting and chest xray negative for mediastinal air  Arrhythmia: no arrhythmia on ekg  CHF: no fluid overload on Cxr and physical exam  Pneumothorax: bilateral breath sounds and no signs of pneumothorax on chest xray     Patient feeling better    Patient felt improved with interventions and has a lower risk of impending cardiac failure to the heart score of 1. Patient was discharged with follow up with Dr salmeron/pcp. Return precautions given, patient understands and agrees with plan. All questions answered.  Instructed to follow up with PCP. I discussed with the patient/family the diagnosis, treatment plan, indications for return to the emergency department, and for expected follow-up. The patient/family verbalized an understanding. The patient/family is asked if there are any questions or concerns. We discuss the case, until all issues are addressed to the patient/family's satisfaction. Patient/family understands and is agreeable to the plan.   Leobardo Sawyer    DISCLAIMER: This note was prepared with [employer] voice recognition transcription software. Garbled syntax, mangled pronouns, and other bizarre constructions may be attributed to that software system.        Risk  OTC drugs.  Prescription drug management.      Additional MDM:   Heart Score:    History:          Slightly suspicious.  ECG:             Normal  Age:               Less than 45 years  Risk factors: 1-2 risk factors  Troponin:       Less than or equal to normal limit  Heart Score = 1                                        Clinical Impression:  Final diagnoses:  [R07.89] Chest wall pain (Primary)          ED Disposition Condition    Discharge Stable          ED Prescriptions       Medication Sig Dispense Start Date End Date Auth. Provider    acetaminophen (TYLENOL) 500 MG tablet Take 1 tablet (500 mg total) by mouth every 6 (six) hours as needed. 13 tablet 12/2/2024 -- Leobardo Sawyer MD    cyclobenzaprine (FLEXERIL) 10 MG tablet Take 1 tablet (10 mg total) by mouth 3 (three) times daily as needed. 15 tablet 12/2/2024 12/7/2024 Leobardo Sawyer MD          Follow-up Information       Follow up With Specialties Details Why Contact Info    Jhoana Trejo MD Internal Medicine Schedule an appointment as soon as possible for a visit in 2 days  [address]  [place] [address]  [phone]               Leobardo Sawyer MD  12/02/24 4974

## 2025-07-08 ENCOUNTER — OFFICE VISIT (OUTPATIENT)
Dept: URBAN - METROPOLITAN AREA CLINIC 51 | Facility: CLINIC | Age: 85
End: 2025-07-08
Payer: MEDICARE

## 2025-07-08 DIAGNOSIS — Z96.1 PRESENCE OF INTRAOCULAR LENS: ICD-10-CM

## 2025-07-08 DIAGNOSIS — E11.9 TYPE 2 DIABETES MELLITUS WITHOUT COMPLICATIONS: Primary | ICD-10-CM

## 2025-07-08 DIAGNOSIS — H04.123 TEAR FILM INSUFFICIENCY OF BILATERAL LACRIMAL GLANDS: ICD-10-CM

## 2025-07-08 DIAGNOSIS — H35.363 DRUSEN (DEGENERATIVE) OF MACULA, BILATERAL: ICD-10-CM

## 2025-07-08 DIAGNOSIS — Z79.84 LONG TERM (CURRENT) USE OF ORAL HYPOGLYCEMIC DRUGS: ICD-10-CM

## 2025-07-08 DIAGNOSIS — H35.373 PUCKERING OF MACULA, BILATERAL: ICD-10-CM

## 2025-07-08 PROCEDURE — 92134 CPTRZ OPH DX IMG PST SGM RTA: CPT | Performed by: OPTOMETRIST

## 2025-07-08 PROCEDURE — 92004 COMPRE OPH EXAM NEW PT 1/>: CPT | Performed by: OPTOMETRIST

## 2025-07-08 ASSESSMENT — VISUAL ACUITY
OD: 20/30
OS: 20/40

## 2025-07-08 ASSESSMENT — INTRAOCULAR PRESSURE
OD: 12
OS: 12

## 2025-07-08 ASSESSMENT — KERATOMETRY
OS: 43.50
OD: 44.50

## 2025-07-16 NOTE — TELEPHONE ENCOUNTER
Copied from CRM #[id number]. Topic: Medications - Medication Refill  >> Jul 16, 2025  9:06 AM Polly wrote:  Who Called: Tray Jalloh    Refill or New Rx:Refill  RX Name and Strength: lisinopriL (PRINIVIL,ZESTRIL) 40 MG tablet    How is the patient currently taking it? (ex. 1XDay): 1x day    Is this a 30 day or 90 day RX: 90 day    Local or Mail Order: Local     List of preferred pharmacies on file (remove unneeded): [unfilled]  If different Pharmacy is requested, enter Pharmacy information here including location and phone number: [address] [place] [address], [place]  [address]   Telephone  Tel: [phone]       Ordering Provider: Elizabeth Lange      Preferred Method of Contact: Phone Call  Patient's Preferred Phone Number on File: [phone]   Best Call Back Number, if different:  Additional Information:   Ok per CAB to give pt sample of victoza while we get a PA done. PA started today key- [id number]    [facility] is reviewing your PA request and will respond within 24-72 hours. To check for an update later, open this request from your dashboard.

## (undated) NOTE — LETTER
11/28/18            Dear 11/28/18:    I am writing to you to remind you to schedule your annual diabetic eye exam.  Diabetes remains one of the leading causes of blindness in American adults.   Early diagnosis is important in preventing the progression to m

## (undated) NOTE — ED AVS SNAPSHOT
Lnida Valiente   MRN: [id number]    Department:  [facility] Emergency Department in [place]   Date of Visit:  11/12/2017           Disclosure     Insurance plans vary and the physician(s) referred by the ER may not be covered by your plan.  Please cont If you have been prescribed any medication(s), please fill your prescription right away and begin taking the medication(s) as directed    If the emergency physician has read X-rays, these will be re-interpreted by a radiologist.  If there is a significant

## (undated) NOTE — LETTER
Date: 4/17/2019    Patient Name: Haylie Clifton          To Whom it may concern:    st. The above patient was seen at the [facility] for treatment of a medical condition. This patient's ideal weight should be 145 lbs. .        Sincerely,

## (undated) NOTE — LETTER
02/22/19      Dear Savita Alatorre records indicate that you are due for one or more of the following Diabetic tests:       __X___            Eye exam     __X___            Diabetic Labs       __X___  Diabetic foot exam & blood pressure check at our office

## (undated) NOTE — MR AVS SNAPSHOT
[facility]  [address], [address]  [address] [place] [address] [phone]               Thank you for choosing us for your health care visit with Gladys Hodge MD.  We are glad to serve you and happy to provide you with this PT STATES SHE THINKS SHE HAD REACTION TO CONTRAST FOR AN MRI YEARS AGO. IODINE MARKED IN CHART. ADDED MRI DYE AS A PRECAUTION.     Nasalcrom Rash    \"AERS\"    Viibryd     Intermittent ear popping, itching, and bad dreams    Zelnorm [Tegaserod Maleate] U TAKE 1 TABLET (300 MG) BY MOUTH ONCE DAILY           LANTUS SOLOSTAR 100 UNIT/ML Sopn   Generic drug:  Insulin Glargine   TAKE 50 UNITS WITH BREAKFAST   What changed:  See the new instructions.            Levothyroxine Sodium 125 MCG Tabs   Take 1 tablet (1 view more details from this visit by going to [email or web address]. If you've recently had a stay at the Hospital you can access your discharge instructions in [facility] by going to Visits < Admission Summaries.  If you've been to the Emergency Depar

## (undated) NOTE — MR AVS SNAPSHOT
[address]  [address] [address] [phone]               Thank you for choosing us for your health care visit with Sotero Masters PA-C.   We are glad to serve you and happy to provide you with Visit for screening mammogram        Paresthesia          Instructions and Information about Your Health    Check on due date for screening mammogram with [facility]  Have labs drawn, fasting, for both sets of orders provided (CBC, CMP, TSH, LIPID PANEL, VITAMI EKG - covered if needed at Welcome to Medicare, and non-screening if indicated for medical reasons Electrocardiogram date10/20/2015 Routine EKG is not a screening covered service except at the Welcome to Medicare Visit    Abdominal aortic aneurysm screeni every 2 yrs up to age 79 or Yearly if High Risk   There are no preventive care reminders to display for this patient. Chlamydia  Annually if high risk No results found for: CHLAMYDIA No flowsheet data found.     Screening Mammogram      Mammogram    Rec previous visit. This may be covered with your pharmacy  prescription benefits     Recommended Websites for Advanced Directives    [email or web address]  An information packet, including necessary form from the [place] BuPROPion HCl ER (XL) 150 MG Tb24   Commonly known as:  WELLBUTRIN XL           BYETTA 10 MCG PEN 10 MCG/0.04ML Sopn   Generic drug:  Exenatide   Take 10 mcg before breakfast and dinner           FLUoxetine HCl 20 MG Caps   daily.    Commonly known as:  MS Commonly known as:  PROTONIX           RaNITidine HCl 300 MG Tabs   Take 300 mg by mouth nightly.    Commonly known as:  ZANTAC           SB LOW DOSE ASA EC 81 MG Tbec   Generic drug:  aspirin   1 TABLET DAILY           SF 1.1 % Gel   Generic drug:  Sodium ? Place light switches within reach of your bed and a night light between the bedroom and bathroom. ? Get up slowly from lying down or sitting if you get dizzy. ? Keep a working flashlight near your bed.   STAIRS:  ? Keep stairwells well lit with light sw